# Patient Record
Sex: FEMALE | Race: WHITE | NOT HISPANIC OR LATINO | URBAN - METROPOLITAN AREA
[De-identification: names, ages, dates, MRNs, and addresses within clinical notes are randomized per-mention and may not be internally consistent; named-entity substitution may affect disease eponyms.]

---

## 2017-08-31 ENCOUNTER — EMERGENCY (EMERGENCY)
Facility: HOSPITAL | Age: 52
LOS: 1 days | End: 2017-08-31
Attending: EMERGENCY MEDICINE | Admitting: EMERGENCY MEDICINE
Payer: COMMERCIAL

## 2017-08-31 VITALS
HEART RATE: 55 BPM | SYSTOLIC BLOOD PRESSURE: 117 MMHG | DIASTOLIC BLOOD PRESSURE: 67 MMHG | RESPIRATION RATE: 20 BRPM | OXYGEN SATURATION: 100 % | TEMPERATURE: 99 F

## 2017-08-31 VITALS
SYSTOLIC BLOOD PRESSURE: 124 MMHG | DIASTOLIC BLOOD PRESSURE: 70 MMHG | HEART RATE: 60 BPM | OXYGEN SATURATION: 100 % | RESPIRATION RATE: 16 BRPM

## 2017-08-31 LAB
ALBUMIN SERPL ELPH-MCNC: 3.9 G/DL — SIGNIFICANT CHANGE UP (ref 3.3–5)
ALP SERPL-CCNC: 71 U/L — SIGNIFICANT CHANGE UP (ref 40–120)
ALT FLD-CCNC: 40 U/L RC — SIGNIFICANT CHANGE UP (ref 10–45)
ANION GAP SERPL CALC-SCNC: 12 MMOL/L — SIGNIFICANT CHANGE UP (ref 5–17)
APPEARANCE UR: CLEAR — SIGNIFICANT CHANGE UP
AST SERPL-CCNC: 26 U/L — SIGNIFICANT CHANGE UP (ref 10–40)
BASOPHILS # BLD AUTO: 0.1 K/UL — SIGNIFICANT CHANGE UP (ref 0–0.2)
BASOPHILS NFR BLD AUTO: 0.7 % — SIGNIFICANT CHANGE UP (ref 0–2)
BILIRUB SERPL-MCNC: 0.4 MG/DL — SIGNIFICANT CHANGE UP (ref 0.2–1.2)
BILIRUB UR-MCNC: NEGATIVE — SIGNIFICANT CHANGE UP
BUN SERPL-MCNC: 13 MG/DL — SIGNIFICANT CHANGE UP (ref 7–23)
CALCIUM SERPL-MCNC: 9.2 MG/DL — SIGNIFICANT CHANGE UP (ref 8.4–10.5)
CHLORIDE SERPL-SCNC: 102 MMOL/L — SIGNIFICANT CHANGE UP (ref 96–108)
CO2 SERPL-SCNC: 25 MMOL/L — SIGNIFICANT CHANGE UP (ref 22–31)
COLOR SPEC: SIGNIFICANT CHANGE UP
CREAT SERPL-MCNC: 0.84 MG/DL — SIGNIFICANT CHANGE UP (ref 0.5–1.3)
DIFF PNL FLD: NEGATIVE — SIGNIFICANT CHANGE UP
EOSINOPHIL # BLD AUTO: 0.1 K/UL — SIGNIFICANT CHANGE UP (ref 0–0.5)
EOSINOPHIL NFR BLD AUTO: 0.8 % — SIGNIFICANT CHANGE UP (ref 0–6)
GLUCOSE SERPL-MCNC: 100 MG/DL — HIGH (ref 70–99)
GLUCOSE UR QL: NEGATIVE — SIGNIFICANT CHANGE UP
HCT VFR BLD CALC: 42.3 % — SIGNIFICANT CHANGE UP (ref 34.5–45)
HGB BLD-MCNC: 13.1 G/DL — SIGNIFICANT CHANGE UP (ref 11.5–15.5)
KETONES UR-MCNC: NEGATIVE — SIGNIFICANT CHANGE UP
LEUKOCYTE ESTERASE UR-ACNC: NEGATIVE — SIGNIFICANT CHANGE UP
LIDOCAIN IGE QN: 36 U/L — SIGNIFICANT CHANGE UP (ref 7–60)
LYMPHOCYTES # BLD AUTO: 1.3 K/UL — SIGNIFICANT CHANGE UP (ref 1–3.3)
LYMPHOCYTES # BLD AUTO: 16.9 % — SIGNIFICANT CHANGE UP (ref 13–44)
MCHC RBC-ENTMCNC: 28.8 PG — SIGNIFICANT CHANGE UP (ref 27–34)
MCHC RBC-ENTMCNC: 30.9 GM/DL — LOW (ref 32–36)
MCV RBC AUTO: 93.2 FL — SIGNIFICANT CHANGE UP (ref 80–100)
MONOCYTES # BLD AUTO: 0.6 K/UL — SIGNIFICANT CHANGE UP (ref 0–0.9)
MONOCYTES NFR BLD AUTO: 7.6 % — SIGNIFICANT CHANGE UP (ref 2–14)
NEUTROPHILS # BLD AUTO: 5.6 K/UL — SIGNIFICANT CHANGE UP (ref 1.8–7.4)
NEUTROPHILS NFR BLD AUTO: 74 % — SIGNIFICANT CHANGE UP (ref 43–77)
NITRITE UR-MCNC: NEGATIVE — SIGNIFICANT CHANGE UP
PH UR: 7 — SIGNIFICANT CHANGE UP (ref 5–8)
PLATELET # BLD AUTO: 296 K/UL — SIGNIFICANT CHANGE UP (ref 150–400)
POTASSIUM SERPL-MCNC: 4.1 MMOL/L — SIGNIFICANT CHANGE UP (ref 3.5–5.3)
POTASSIUM SERPL-SCNC: 4.1 MMOL/L — SIGNIFICANT CHANGE UP (ref 3.5–5.3)
PROT SERPL-MCNC: 8 G/DL — SIGNIFICANT CHANGE UP (ref 6–8.3)
PROT UR-MCNC: NEGATIVE — SIGNIFICANT CHANGE UP
RBC # BLD: 4.54 M/UL — SIGNIFICANT CHANGE UP (ref 3.8–5.2)
RBC # FLD: 12.3 % — SIGNIFICANT CHANGE UP (ref 10.3–14.5)
RBC CASTS # UR COMP ASSIST: SIGNIFICANT CHANGE UP /HPF (ref 0–2)
SODIUM SERPL-SCNC: 139 MMOL/L — SIGNIFICANT CHANGE UP (ref 135–145)
SP GR SPEC: 1.01 — LOW (ref 1.01–1.02)
UROBILINOGEN FLD QL: NEGATIVE — SIGNIFICANT CHANGE UP
WBC # BLD: 7.6 K/UL — SIGNIFICANT CHANGE UP (ref 3.8–10.5)
WBC # FLD AUTO: 7.6 K/UL — SIGNIFICANT CHANGE UP (ref 3.8–10.5)

## 2017-08-31 PROCEDURE — 85027 COMPLETE CBC AUTOMATED: CPT

## 2017-08-31 PROCEDURE — 80053 COMPREHEN METABOLIC PANEL: CPT

## 2017-08-31 PROCEDURE — 74176 CT ABD & PELVIS W/O CONTRAST: CPT | Mod: 26

## 2017-08-31 PROCEDURE — 81001 URINALYSIS AUTO W/SCOPE: CPT

## 2017-08-31 PROCEDURE — 96374 THER/PROPH/DIAG INJ IV PUSH: CPT

## 2017-08-31 PROCEDURE — 99285 EMERGENCY DEPT VISIT HI MDM: CPT

## 2017-08-31 PROCEDURE — 99284 EMERGENCY DEPT VISIT MOD MDM: CPT | Mod: 25

## 2017-08-31 PROCEDURE — 87086 URINE CULTURE/COLONY COUNT: CPT

## 2017-08-31 PROCEDURE — 74176 CT ABD & PELVIS W/O CONTRAST: CPT

## 2017-08-31 PROCEDURE — 83690 ASSAY OF LIPASE: CPT

## 2017-08-31 RX ORDER — KETOROLAC TROMETHAMINE 30 MG/ML
15 SYRINGE (ML) INJECTION ONCE
Qty: 0 | Refills: 0 | Status: DISCONTINUED | OUTPATIENT
Start: 2017-08-31 | End: 2017-08-31

## 2017-08-31 RX ORDER — CEFUROXIME AXETIL 250 MG
1 TABLET ORAL
Qty: 20 | Refills: 0
Start: 2017-08-31 | End: 2017-09-10

## 2017-08-31 RX ORDER — SODIUM CHLORIDE 9 MG/ML
1000 INJECTION INTRAMUSCULAR; INTRAVENOUS; SUBCUTANEOUS ONCE
Qty: 0 | Refills: 0 | Status: COMPLETED | OUTPATIENT
Start: 2017-08-31 | End: 2017-08-31

## 2017-08-31 RX ADMIN — Medication 15 MILLIGRAM(S): at 11:22

## 2017-08-31 RX ADMIN — Medication 15 MILLIGRAM(S): at 13:38

## 2017-08-31 RX ADMIN — SODIUM CHLORIDE 1000 MILLILITER(S): 9 INJECTION INTRAMUSCULAR; INTRAVENOUS; SUBCUTANEOUS at 11:22

## 2017-08-31 NOTE — ED PROVIDER NOTE - MEDICAL DECISION MAKING DETAILS
Flank pain most consistent with nephrolithiasis  1) LABS/UA/IVF if vomiting  2) CT abd/pelvis - [place] 3) pain control and anti-emetics as needed 4) reassess 5) urologic consultation if necessary

## 2017-08-31 NOTE — ED PROVIDER NOTE - ATTENDING CONTRIBUTION TO CARE
Pt with L flank/back pain radiating across the abdomen.  +urinary frequency.  No midline spine td, +td paraspinal L.

## 2017-08-31 NOTE — ED PROVIDER NOTE - NS ED ROS FT
No fever/chills, no change in vision, no throat pain, no chest pain, +abdominal pain, no nausea/vomiting,  no dysuria, no joint pain, no rashes, no focal numbness or weakness, no known mental health issues

## 2017-08-31 NOTE — ED ADULT NURSE NOTE - OBJECTIVE STATEMENT
52 y.o female c c/o L flank pain. Pt denies dysuria/ hematuria. Pt admits to urinary frequency. Hx of renal calculi. Afebrile. No chills. Denies CP/SOB. Abd soft. No lower extremity edema. Skin intact. 52 y.o female c c/o L flank pain. Pt denies dysuria/ hematuria. Pt admits to urinary frequency. Hx of renal calculi. Afebrile. No chills. Denies CP/SOB. Abd soft L flank pain radiates into Abd. No lower extremity edema. Skin intact. Denies NVD. Denies vaginal discharge. Family at bedside,

## 2017-08-31 NOTE — ED PROVIDER NOTE - OBJECTIVE STATEMENT
53 yo F ?hx of renal stone on u/s pw left flank pain and urinary freq since sunday. Pain not releived by oxycodone this am so pt presented to ed. Pain is persitant radiates to abdomen. Denies nausea, vomiting, diarrhea, constipation, dysuria, hematuria, vaginal discharge, fever, chills, 53 yo F ?hx of renal stone on u/s pw left flank pain and urinary freq since sunday. Pain not releived by oxycodone this am so pt presented to ed. Pain is persistent radiates to abdomen. Denies nausea, vomiting, diarrhea, constipation, dysuria, hematuria, vaginal discharge, fever, chills,

## 2017-08-31 NOTE — ED PROVIDER NOTE - PLAN OF CARE
Follow up with your PMD within 48-72 hours.  Take Ceftin (Cefuroxime) 1 tab by mouth twice a day for 7 days.  Increase fluids. Motrin 600mg every 8 hours with food for pain. Worsening pain, new fever, chills, nausea, vomiting return to ER Follow up with your PMD within 48-72 hrs. Show copies of your reports given to you. Take all of your medications as previously prescribed. Take Ceftin (Cefuroxime) 1 tab by mouth twice a day for 7 days.  Increase fluids. Motrin 600mg every 8 hours with food for pain. Worsening pain, new fever, chills, nausea, vomiting return to ER

## 2017-09-01 LAB
CULTURE RESULTS: NO GROWTH — SIGNIFICANT CHANGE UP
SPECIMEN SOURCE: SIGNIFICANT CHANGE UP

## 2017-12-14 ENCOUNTER — RESULT REVIEW (OUTPATIENT)
Age: 52
End: 2017-12-14

## 2019-02-18 NOTE — ED PROVIDER NOTE - NS ED ATTENDING STATEMENT MOD
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 I have personally seen and examined this patient.  I have fully participated in the care of this patient. I have reviewed all pertinent clinical information, including history, physical exam, plan and the Resident’s note and agree except as noted.

## 2019-06-07 ENCOUNTER — OTHER (OUTPATIENT)
Age: 54
End: 2019-06-07

## 2019-06-13 ENCOUNTER — APPOINTMENT (OUTPATIENT)
Dept: INTERNAL MEDICINE | Facility: CLINIC | Age: 54
End: 2019-06-13
Payer: COMMERCIAL

## 2019-06-13 VITALS
BODY MASS INDEX: 36.99 KG/M2 | WEIGHT: 201 LBS | TEMPERATURE: 98.4 F | OXYGEN SATURATION: 96 % | HEART RATE: 70 BPM | SYSTOLIC BLOOD PRESSURE: 125 MMHG | DIASTOLIC BLOOD PRESSURE: 77 MMHG | HEIGHT: 62 IN | RESPIRATION RATE: 14 BRPM

## 2019-06-13 DIAGNOSIS — Z78.9 OTHER SPECIFIED HEALTH STATUS: ICD-10-CM

## 2019-06-13 PROCEDURE — 36415 COLL VENOUS BLD VENIPUNCTURE: CPT

## 2019-06-13 PROCEDURE — 99203 OFFICE O/P NEW LOW 30 MIN: CPT | Mod: 25

## 2019-06-14 LAB
APPEARANCE: CLEAR
BACTERIA: NEGATIVE
BASOPHILS # BLD AUTO: 0.04 K/UL
BASOPHILS NFR BLD AUTO: 0.5 %
BILIRUBIN URINE: NEGATIVE
BLOOD URINE: NEGATIVE
COLOR: YELLOW
EOSINOPHIL # BLD AUTO: 0.2 K/UL
EOSINOPHIL NFR BLD AUTO: 2.3 %
GLUCOSE QUALITATIVE U: NEGATIVE
HCT VFR BLD CALC: 44.2 %
HGB BLD-MCNC: 13.5 G/DL
HYALINE CASTS: 2 /LPF
IMM GRANULOCYTES NFR BLD AUTO: 0.3 %
KETONES URINE: NEGATIVE
LEUKOCYTE ESTERASE URINE: NEGATIVE
LYMPHOCYTES # BLD AUTO: 2.28 K/UL
LYMPHOCYTES NFR BLD AUTO: 26 %
MAN DIFF?: NORMAL
MCHC RBC-ENTMCNC: 29.3 PG
MCHC RBC-ENTMCNC: 30.5 GM/DL
MCV RBC AUTO: 96.1 FL
MICROSCOPIC-UA: NORMAL
MONOCYTES # BLD AUTO: 0.71 K/UL
MONOCYTES NFR BLD AUTO: 8.1 %
NEUTROPHILS # BLD AUTO: 5.51 K/UL
NEUTROPHILS NFR BLD AUTO: 62.8 %
NITRITE URINE: NEGATIVE
PH URINE: 6.5
PLATELET # BLD AUTO: 325 K/UL
PROTEIN URINE: NEGATIVE
RBC # BLD: 4.6 M/UL
RBC # FLD: 14.1 %
RED BLOOD CELLS URINE: 2 /HPF
SPECIFIC GRAVITY URINE: 1.03
SQUAMOUS EPITHELIAL CELLS: 1 /HPF
UROBILINOGEN URINE: NORMAL
WBC # FLD AUTO: 8.77 K/UL
WHITE BLOOD CELLS URINE: 2 /HPF

## 2019-06-15 PROBLEM — Z78.9 NON-SMOKER: Status: ACTIVE | Noted: 2019-06-15

## 2019-06-15 NOTE — PHYSICAL EXAM
[Well Developed] : well developed [No Acute Distress] : no acute distress [Well Nourished] : well nourished [Well-Appearing] : well-appearing [Normal Sclera/Conjunctiva] : normal sclera/conjunctiva [PERRL] : pupils equal round and reactive to light [Normal Oropharynx] : the oropharynx was normal [Normal Outer Ear/Nose] : the outer ears and nose were normal in appearance [EOMI] : extraocular movements intact [Supple] : supple [No JVD] : no jugular venous distention [Thyroid Normal, No Nodules] : the thyroid was normal and there were no nodules present [No Lymphadenopathy] : no lymphadenopathy [Clear to Auscultation] : lungs were clear to auscultation bilaterally [No Accessory Muscle Use] : no accessory muscle use [No Respiratory Distress] : no respiratory distress  [Normal Rate] : normal rate  [Regular Rhythm] : with a regular rhythm [Normal S1, S2] : normal S1 and S2 [No Murmur] : no murmur heard [No Abdominal Bruit] : a ~M bruit was not heard ~T in the abdomen [No Carotid Bruits] : no carotid bruits [No Varicosities] : no varicosities [Pedal Pulses Present] : the pedal pulses are present [No Edema] : there was no peripheral edema [No Extremity Clubbing/Cyanosis] : no extremity clubbing/cyanosis [No Palpable Aorta] : no palpable aorta [Normal Appearance] : normal in appearance [No Nipple Discharge] : no nipple discharge [No Axillary Lymphadenopathy] : no axillary lymphadenopathy [Non Tender] : non-tender [No Masses] : no abdominal mass palpated [Non-distended] : non-distended [Soft] : abdomen soft [No HSM] : no HSM [Normal Bowel Sounds] : normal bowel sounds [Normal Posterior Cervical Nodes] : no posterior cervical lymphadenopathy [Normal Anterior Cervical Nodes] : no anterior cervical lymphadenopathy [No Spinal Tenderness] : no spinal tenderness [No CVA Tenderness] : no CVA  tenderness [Grossly Normal Strength/Tone] : grossly normal strength/tone [No Joint Swelling] : no joint swelling [No Rash] : no rash [No Focal Deficits] : no focal deficits [Normal Gait] : normal gait [Coordination Grossly Intact] : coordination grossly intact [Deep Tendon Reflexes (DTR)] : deep tendon reflexes were 2+ and symmetric [Normal Insight/Judgement] : insight and judgment were intact [Normal Affect] : the affect was normal [de-identified] : n [FreeTextEntry1] : deferred

## 2019-06-15 NOTE — HEALTH RISK ASSESSMENT
[Very Good] : ~his/her~ current health as very good [No falls in past year] : Patient reported no falls in the past year [0] : 2) Feeling down, depressed, or hopeless: Not at all (0) [] : No

## 2019-06-15 NOTE — HISTORY OF PRESENT ILLNESS
[FreeTextEntry1] : pt here for evaluation and blood tests\par pain right foot, seen by podiatrist and given Meloxicam [de-identified] : 54 yo wf with hx of hypothyroidism, came to the office for blood tests and adjustment of her medications

## 2019-06-17 ENCOUNTER — RX RENEWAL (OUTPATIENT)
Age: 54
End: 2019-06-17

## 2019-06-17 LAB
ALBUMIN SERPL ELPH-MCNC: 4.4 G/DL
ALP BLD-CCNC: 78 U/L
ALT SERPL-CCNC: 43 U/L
ANION GAP SERPL CALC-SCNC: 13 MMOL/L
AST SERPL-CCNC: 39 U/L
BILIRUB SERPL-MCNC: 0.2 MG/DL
BUN SERPL-MCNC: 19 MG/DL
CALCIUM SERPL-MCNC: 9.3 MG/DL
CHLORIDE SERPL-SCNC: 102 MMOL/L
CHOLEST SERPL-MCNC: 217 MG/DL
CHOLEST/HDLC SERPL: 4.1 RATIO
CO2 SERPL-SCNC: 24 MMOL/L
CREAT SERPL-MCNC: 0.82 MG/DL
GLUCOSE SERPL-MCNC: 66 MG/DL
HDLC SERPL-MCNC: 53 MG/DL
LDLC SERPL CALC-MCNC: 148 MG/DL
POTASSIUM SERPL-SCNC: 4.8 MMOL/L
PROT SERPL-MCNC: 7.7 G/DL
SODIUM SERPL-SCNC: 139 MMOL/L
T3 SERPL-MCNC: 87 NG/DL
T4 FREE SERPL-MCNC: 1.3 NG/DL
T4 SERPL-MCNC: 7.7 UG/DL
TRIGL SERPL-MCNC: 82 MG/DL
TSH SERPL-ACNC: 4.39 UIU/ML

## 2019-06-19 ENCOUNTER — OTHER (OUTPATIENT)
Age: 54
End: 2019-06-19

## 2019-10-04 ENCOUNTER — RESULT REVIEW (OUTPATIENT)
Age: 54
End: 2019-10-04

## 2020-01-03 ENCOUNTER — APPOINTMENT (OUTPATIENT)
Dept: INTERNAL MEDICINE | Facility: CLINIC | Age: 55
End: 2020-01-03
Payer: COMMERCIAL

## 2020-01-03 ENCOUNTER — NON-APPOINTMENT (OUTPATIENT)
Age: 55
End: 2020-01-03

## 2020-01-03 VITALS
SYSTOLIC BLOOD PRESSURE: 128 MMHG | WEIGHT: 197 LBS | BODY MASS INDEX: 36.25 KG/M2 | TEMPERATURE: 99.1 F | HEART RATE: 74 BPM | HEIGHT: 62 IN | RESPIRATION RATE: 15 BRPM | OXYGEN SATURATION: 98 % | DIASTOLIC BLOOD PRESSURE: 80 MMHG

## 2020-01-03 PROCEDURE — 93000 ELECTROCARDIOGRAM COMPLETE: CPT

## 2020-01-03 PROCEDURE — 99396 PREV VISIT EST AGE 40-64: CPT | Mod: 25

## 2020-01-03 PROCEDURE — 36415 COLL VENOUS BLD VENIPUNCTURE: CPT

## 2020-01-03 NOTE — PHYSICAL EXAM
[No Acute Distress] : no acute distress [Well Nourished] : well nourished [Well Developed] : well developed [Well-Appearing] : well-appearing [Normal Sclera/Conjunctiva] : normal sclera/conjunctiva [PERRL] : pupils equal round and reactive to light [EOMI] : extraocular movements intact [Normal Outer Ear/Nose] : the outer ears and nose were normal in appearance [No JVD] : no jugular venous distention [No Lymphadenopathy] : no lymphadenopathy [Supple] : supple [Thyroid Normal, No Nodules] : the thyroid was normal and there were no nodules present [No Respiratory Distress] : no respiratory distress  [No Accessory Muscle Use] : no accessory muscle use [Clear to Auscultation] : lungs were clear to auscultation bilaterally [Normal Rate] : normal rate  [Regular Rhythm] : with a regular rhythm [Normal S1, S2] : normal S1 and S2 [No Murmur] : no murmur heard [No Carotid Bruits] : no carotid bruits [No Abdominal Bruit] : a ~M bruit was not heard ~T in the abdomen [No Varicosities] : no varicosities [Pedal Pulses Present] : the pedal pulses are present [No Edema] : there was no peripheral edema [No Palpable Aorta] : no palpable aorta [No Extremity Clubbing/Cyanosis] : no extremity clubbing/cyanosis [Normal Appearance] : normal in appearance [No Nipple Discharge] : no nipple discharge [No Axillary Lymphadenopathy] : no axillary lymphadenopathy [Soft] : abdomen soft [Non Tender] : non-tender [Non-distended] : non-distended [No Masses] : no abdominal mass palpated [No HSM] : no HSM [Normal Bowel Sounds] : normal bowel sounds [Normal Posterior Cervical Nodes] : no posterior cervical lymphadenopathy [Normal Anterior Cervical Nodes] : no anterior cervical lymphadenopathy [No CVA Tenderness] : no CVA  tenderness [No Spinal Tenderness] : no spinal tenderness [No Joint Swelling] : no joint swelling [Grossly Normal Strength/Tone] : grossly normal strength/tone [No Rash] : no rash [Coordination Grossly Intact] : coordination grossly intact [No Focal Deficits] : no focal deficits [Normal Gait] : normal gait [Deep Tendon Reflexes (DTR)] : deep tendon reflexes were 2+ and symmetric [Normal Affect] : the affect was normal [Normal Insight/Judgement] : insight and judgment were intact [de-identified] : three small erythematous areas on the palate  [FreeTextEntry1] : deferred

## 2020-01-03 NOTE — HEALTH RISK ASSESSMENT
[Good] : ~his/her~ current health as good [No] : No [No falls in past year] : Patient reported no falls in the past year [0] : 2) Feeling down, depressed, or hopeless: Not at all (0) [] : No [MammogramDate] : 10/19 [MammogramComments] : [employer]

## 2020-01-03 NOTE — HISTORY OF PRESENT ILLNESS
[FreeTextEntry1] : Physical examination  [de-identified] : CRYSTAL LI is a 54 year old female patient who presents today for a physical examination. Pt relates that she was seen by gastroenterologist two months ago, where an EGD and colonoscopy and were done. Benign polyps were found, as well as gastritis, ulcers, and H. pylori. Antibiotics were administered and she completed the treatment December 26, however, she is still taking Omeprazole. \par \par

## 2020-01-03 NOTE — END OF VISIT
[FreeTextEntry3] : I, Poly Mcdowell, personally transcribed these services in the presence of Dr. Emma Winkler MD. I, Dr. Emma Winkler MD, personally performed the services described in this documentation as scribed by Poly Mcdowell in my presence and it is both accurate and complete.

## 2020-01-04 LAB
25(OH)D3 SERPL-MCNC: 29.1 NG/ML
ALBUMIN SERPL ELPH-MCNC: 4.2 G/DL
ALP BLD-CCNC: 72 U/L
ALT SERPL-CCNC: 32 U/L
ANION GAP SERPL CALC-SCNC: 16 MMOL/L
APPEARANCE: CLEAR
AST SERPL-CCNC: 31 U/L
BACTERIA: NEGATIVE
BASOPHILS # BLD AUTO: 0.05 K/UL
BASOPHILS NFR BLD AUTO: 0.9 %
BILIRUB SERPL-MCNC: 0.3 MG/DL
BILIRUBIN URINE: NEGATIVE
BLOOD URINE: NEGATIVE
BUN SERPL-MCNC: 17 MG/DL
CALCIUM SERPL-MCNC: 9.3 MG/DL
CHLORIDE SERPL-SCNC: 104 MMOL/L
CHOLEST SERPL-MCNC: 235 MG/DL
CHOLEST/HDLC SERPL: 4.1 RATIO
CO2 SERPL-SCNC: 21 MMOL/L
COLOR: YELLOW
CREAT SERPL-MCNC: 0.82 MG/DL
EOSINOPHIL # BLD AUTO: 0.22 K/UL
EOSINOPHIL NFR BLD AUTO: 4 %
GLUCOSE QUALITATIVE U: NEGATIVE
GLUCOSE SERPL-MCNC: 74 MG/DL
HCT VFR BLD CALC: 44.7 %
HDLC SERPL-MCNC: 57 MG/DL
HGB BLD-MCNC: 13.8 G/DL
HYALINE CASTS: 1 /LPF
IMM GRANULOCYTES NFR BLD AUTO: 0.2 %
KETONES URINE: NEGATIVE
LDLC SERPL CALC-MCNC: 163 MG/DL
LEUKOCYTE ESTERASE URINE: NEGATIVE
LYMPHOCYTES # BLD AUTO: 1.39 K/UL
LYMPHOCYTES NFR BLD AUTO: 25.3 %
MAN DIFF?: NORMAL
MCHC RBC-ENTMCNC: 29.7 PG
MCHC RBC-ENTMCNC: 30.9 GM/DL
MCV RBC AUTO: 96.1 FL
MICROSCOPIC-UA: NORMAL
MONOCYTES # BLD AUTO: 0.55 K/UL
MONOCYTES NFR BLD AUTO: 10 %
NEUTROPHILS # BLD AUTO: 3.28 K/UL
NEUTROPHILS NFR BLD AUTO: 59.6 %
NITRITE URINE: NEGATIVE
PH URINE: 6
PLATELET # BLD AUTO: 305 K/UL
POTASSIUM SERPL-SCNC: 4.5 MMOL/L
PROT SERPL-MCNC: 7.4 G/DL
PROTEIN URINE: NEGATIVE
RBC # BLD: 4.65 M/UL
RBC # FLD: 14.4 %
RED BLOOD CELLS URINE: 1 /HPF
SODIUM SERPL-SCNC: 141 MMOL/L
SPECIFIC GRAVITY URINE: 1.02
SQUAMOUS EPITHELIAL CELLS: 2 /HPF
T3 SERPL-MCNC: 88 NG/DL
T4 FREE SERPL-MCNC: 1.3 NG/DL
T4 SERPL-MCNC: 8.5 UG/DL
TRIGL SERPL-MCNC: 74 MG/DL
TSH SERPL-ACNC: 3.05 UIU/ML
UROBILINOGEN URINE: NORMAL
WBC # FLD AUTO: 5.5 K/UL
WHITE BLOOD CELLS URINE: 1 /HPF

## 2020-01-08 ENCOUNTER — MOBILE ON CALL (OUTPATIENT)
Age: 55
End: 2020-01-08

## 2020-01-17 ENCOUNTER — APPOINTMENT (OUTPATIENT)
Dept: INTERNAL MEDICINE | Facility: CLINIC | Age: 55
End: 2020-01-17

## 2020-05-04 ENCOUNTER — APPOINTMENT (OUTPATIENT)
Dept: INTERNAL MEDICINE | Facility: CLINIC | Age: 55
End: 2020-05-04
Payer: MEDICAID

## 2020-05-04 VITALS
SYSTOLIC BLOOD PRESSURE: 122 MMHG | BODY MASS INDEX: 34.04 KG/M2 | HEART RATE: 74 BPM | WEIGHT: 185 LBS | DIASTOLIC BLOOD PRESSURE: 80 MMHG | RESPIRATION RATE: 15 BRPM | HEIGHT: 62 IN | TEMPERATURE: 98.3 F | OXYGEN SATURATION: 97 %

## 2020-05-04 PROCEDURE — 99213 OFFICE O/P EST LOW 20 MIN: CPT | Mod: 25

## 2020-05-04 PROCEDURE — 36415 COLL VENOUS BLD VENIPUNCTURE: CPT

## 2020-05-04 NOTE — HISTORY OF PRESENT ILLNESS
[FreeTextEntry1] : F/U BLOOD TESTS\par ANOSMIA SINCE MARCH 19/2020 [de-identified] : 55 YO WF WITH HX OF HYPOTHYROIDISM, CAME TO THE OFFICE FOR BLOOD TESTS. PT RELATES ANOSMIA SINCE  MARCH 19 /2020\par  NO TEST FOR COVID 19 WAS DONE

## 2020-05-05 LAB
ALBUMIN SERPL ELPH-MCNC: 4.1 G/DL
ALP BLD-CCNC: 79 U/L
ALT SERPL-CCNC: 30 U/L
ANION GAP SERPL CALC-SCNC: 11 MMOL/L
AST SERPL-CCNC: 26 U/L
BASOPHILS # BLD AUTO: 0.03 K/UL
BASOPHILS NFR BLD AUTO: 0.5 %
BILIRUB SERPL-MCNC: 0.4 MG/DL
BUN SERPL-MCNC: 13 MG/DL
CALCIUM SERPL-MCNC: 9.2 MG/DL
CHLORIDE SERPL-SCNC: 106 MMOL/L
CHOLEST SERPL-MCNC: 221 MG/DL
CHOLEST/HDLC SERPL: 3.9 RATIO
CO2 SERPL-SCNC: 23 MMOL/L
CREAT SERPL-MCNC: 0.72 MG/DL
EOSINOPHIL # BLD AUTO: 0.09 K/UL
EOSINOPHIL NFR BLD AUTO: 1.4 %
GLUCOSE SERPL-MCNC: 81 MG/DL
HCT VFR BLD CALC: 40.9 %
HDLC SERPL-MCNC: 56 MG/DL
HGB BLD-MCNC: 12.8 G/DL
IMM GRANULOCYTES NFR BLD AUTO: 0.2 %
LDLC SERPL CALC-MCNC: 153 MG/DL
LYMPHOCYTES # BLD AUTO: 1.81 K/UL
LYMPHOCYTES NFR BLD AUTO: 27.4 %
MAN DIFF?: NORMAL
MCHC RBC-ENTMCNC: 29.8 PG
MCHC RBC-ENTMCNC: 31.3 GM/DL
MCV RBC AUTO: 95.3 FL
MONOCYTES # BLD AUTO: 0.62 K/UL
MONOCYTES NFR BLD AUTO: 9.4 %
NEUTROPHILS # BLD AUTO: 4.04 K/UL
NEUTROPHILS NFR BLD AUTO: 61.1 %
PLATELET # BLD AUTO: 281 K/UL
POTASSIUM SERPL-SCNC: 4.7 MMOL/L
PROT SERPL-MCNC: 7.2 G/DL
RBC # BLD: 4.29 M/UL
RBC # FLD: 14.6 %
SODIUM SERPL-SCNC: 140 MMOL/L
T3 SERPL-MCNC: 101 NG/DL
T4 FREE SERPL-MCNC: 1.4 NG/DL
T4 SERPL-MCNC: 9 UG/DL
TRIGL SERPL-MCNC: 60 MG/DL
TSH SERPL-ACNC: 2.44 UIU/ML
WBC # FLD AUTO: 6.6 K/UL

## 2020-08-19 ENCOUNTER — RX RENEWAL (OUTPATIENT)
Age: 55
End: 2020-08-19

## 2020-10-20 ENCOUNTER — APPOINTMENT (OUTPATIENT)
Dept: INTERNAL MEDICINE | Facility: CLINIC | Age: 55
End: 2020-10-20
Payer: MEDICAID

## 2020-10-20 VITALS
SYSTOLIC BLOOD PRESSURE: 125 MMHG | HEART RATE: 67 BPM | RESPIRATION RATE: 14 BRPM | DIASTOLIC BLOOD PRESSURE: 80 MMHG | TEMPERATURE: 98.7 F | WEIGHT: 187 LBS | OXYGEN SATURATION: 97 % | BODY MASS INDEX: 34.41 KG/M2 | HEIGHT: 62 IN

## 2020-10-20 PROCEDURE — 36415 COLL VENOUS BLD VENIPUNCTURE: CPT

## 2020-10-20 PROCEDURE — 90686 IIV4 VACC NO PRSV 0.5 ML IM: CPT

## 2020-10-20 PROCEDURE — G0008: CPT

## 2020-10-20 PROCEDURE — 99214 OFFICE O/P EST MOD 30 MIN: CPT | Mod: 25

## 2020-10-20 PROCEDURE — 99072 ADDL SUPL MATRL&STAF TM PHE: CPT

## 2020-10-21 NOTE — HISTORY OF PRESENT ILLNESS
DISPLAY PLAN FREE TEXT
[FreeTextEntry8] : CRYSTAL LI is a 55 year old female patient with a PMHx of HLD and hypothyroidism who presents today complaining of pain of the right foot. Pain has occurred on and off for two years, however, last week, pain became worse. She requests H. Influenza vaccination and screening for COVID-19 antibodies.

## 2020-10-21 NOTE — HEALTH RISK ASSESSMENT
[Never (0 pts)] : Never (0 points) [No] : In the past 12 months have you used drugs other than those required for medical reasons? No [No falls in past year] : Patient reported no falls in the past year [0] : 2) Feeling down, depressed, or hopeless: Not at all (0) [] : No [Audit-CScore] : 0 [de-identified] : sedentary [de-identified] : regular [[id number]] : 0

## 2020-10-21 NOTE — PLAN
[FreeTextEntry1] : H. Influenza vaccination \par \par Screening for COVID-19 antibodies \par \par Blood tests sent to the lab \par \par X-ray right foot\par \par Meloxicam 15mg PO QD

## 2020-10-21 NOTE — PHYSICAL EXAM
[Well Nourished] : well nourished [No Acute Distress] : no acute distress [Normal Sclera/Conjunctiva] : normal sclera/conjunctiva [Well-Appearing] : well-appearing [Well Developed] : well developed [PERRL] : pupils equal round and reactive to light [EOMI] : extraocular movements intact [Normal Outer Ear/Nose] : the outer ears and nose were normal in appearance [Normal Oropharynx] : the oropharynx was normal [No Lymphadenopathy] : no lymphadenopathy [No JVD] : no jugular venous distention [Supple] : supple [No Respiratory Distress] : no respiratory distress  [Thyroid Normal, No Nodules] : the thyroid was normal and there were no nodules present [No Accessory Muscle Use] : no accessory muscle use [Normal Rate] : normal rate  [Clear to Auscultation] : lungs were clear to auscultation bilaterally [No Murmur] : no murmur heard [Normal S1, S2] : normal S1 and S2 [Regular Rhythm] : with a regular rhythm [No Varicosities] : no varicosities [No Carotid Bruits] : no carotid bruits [No Abdominal Bruit] : a ~M bruit was not heard ~T in the abdomen [No Edema] : there was no peripheral edema [No Palpable Aorta] : no palpable aorta [Pedal Pulses Present] : the pedal pulses are present [No Extremity Clubbing/Cyanosis] : no extremity clubbing/cyanosis [No Axillary Lymphadenopathy] : no axillary lymphadenopathy [Normal Appearance] : normal in appearance [No Nipple Discharge] : no nipple discharge [Soft] : abdomen soft [Non Tender] : non-tender [Non-distended] : non-distended [No Masses] : no abdominal mass palpated [Normal Bowel Sounds] : normal bowel sounds [No HSM] : no HSM [Normal Anterior Cervical Nodes] : no anterior cervical lymphadenopathy [No CVA Tenderness] : no CVA  tenderness [Normal Posterior Cervical Nodes] : no posterior cervical lymphadenopathy [No Spinal Tenderness] : no spinal tenderness [No Joint Swelling] : no joint swelling [Grossly Normal Strength/Tone] : grossly normal strength/tone [No Rash] : no rash [Coordination Grossly Intact] : coordination grossly intact [Normal Gait] : normal gait [No Focal Deficits] : no focal deficits [Normal Affect] : the affect was normal [Normal Insight/Judgement] : insight and judgment were intact [FreeTextEntry1] : deferred [de-identified] : tenderness to palpation right foot

## 2020-10-28 LAB
25(OH)D3 SERPL-MCNC: 41.8 NG/ML
ALBUMIN SERPL ELPH-MCNC: 4.5 G/DL
ALP BLD-CCNC: 111 U/L
ALT SERPL-CCNC: 79 U/L
ANION GAP SERPL CALC-SCNC: 13 MMOL/L
APPEARANCE: CLEAR
AST SERPL-CCNC: 45 U/L
BACTERIA: NEGATIVE
BASOPHILS # BLD AUTO: 0.05 K/UL
BASOPHILS NFR BLD AUTO: 0.6 %
BILIRUB SERPL-MCNC: 0.4 MG/DL
BILIRUBIN URINE: NEGATIVE
BLOOD URINE: NEGATIVE
BUN SERPL-MCNC: 19 MG/DL
CALCIUM SERPL-MCNC: 9.8 MG/DL
CHLORIDE SERPL-SCNC: 102 MMOL/L
CHOLEST SERPL-MCNC: 229 MG/DL
CO2 SERPL-SCNC: 25 MMOL/L
COLOR: YELLOW
CREAT SERPL-MCNC: 0.76 MG/DL
EOSINOPHIL # BLD AUTO: 0.23 K/UL
EOSINOPHIL NFR BLD AUTO: 2.6 %
GLUCOSE QUALITATIVE U: NEGATIVE
GLUCOSE SERPL-MCNC: 88 MG/DL
HCT VFR BLD CALC: 45.7 %
HDLC SERPL-MCNC: 66 MG/DL
HGB BLD-MCNC: 14.3 G/DL
HYALINE CASTS: 4 /LPF
IMM GRANULOCYTES NFR BLD AUTO: 0.2 %
KETONES URINE: NEGATIVE
LDLC SERPL CALC-MCNC: 149 MG/DL
LEUKOCYTE ESTERASE URINE: ABNORMAL
LYMPHOCYTES # BLD AUTO: 2.44 K/UL
LYMPHOCYTES NFR BLD AUTO: 27.8 %
MAN DIFF?: NORMAL
MCHC RBC-ENTMCNC: 29.7 PG
MCHC RBC-ENTMCNC: 31.3 GM/DL
MCV RBC AUTO: 94.8 FL
MICROSCOPIC-UA: NORMAL
MONOCYTES # BLD AUTO: 0.8 K/UL
MONOCYTES NFR BLD AUTO: 9.1 %
NEUTROPHILS # BLD AUTO: 5.25 K/UL
NEUTROPHILS NFR BLD AUTO: 59.7 %
NITRITE URINE: NEGATIVE
NONHDLC SERPL-MCNC: 164 MG/DL
PH URINE: 5.5
PLATELET # BLD AUTO: 285 K/UL
POTASSIUM SERPL-SCNC: 4.5 MMOL/L
PROT SERPL-MCNC: 8.4 G/DL
PROTEIN URINE: NEGATIVE
RBC # BLD: 4.82 M/UL
RBC # FLD: 14.2 %
RED BLOOD CELLS URINE: 4 /HPF
SARS-COV-2 IGG SERPL IA-ACNC: 138 INDEX
SARS-COV-2 IGG SERPL QL IA: POSITIVE
SODIUM SERPL-SCNC: 140 MMOL/L
SPECIFIC GRAVITY URINE: 1.02
SQUAMOUS EPITHELIAL CELLS: 7 /HPF
T3 SERPL-MCNC: 109 NG/DL
T4 FREE SERPL-MCNC: 1.4 NG/DL
T4 SERPL-MCNC: 10.6 UG/DL
TRIGL SERPL-MCNC: 73 MG/DL
TSH SERPL-ACNC: 3.77 UIU/ML
UROBILINOGEN URINE: NORMAL
VIT B12 SERPL-MCNC: 876 PG/ML
WBC # FLD AUTO: 8.79 K/UL
WHITE BLOOD CELLS URINE: 15 /HPF

## 2020-11-09 ENCOUNTER — APPOINTMENT (OUTPATIENT)
Dept: INTERNAL MEDICINE | Facility: CLINIC | Age: 55
End: 2020-11-09
Payer: MEDICAID

## 2020-11-09 ENCOUNTER — APPOINTMENT (OUTPATIENT)
Dept: HEART AND VASCULAR | Facility: CLINIC | Age: 55
End: 2020-11-09

## 2020-11-09 VITALS
WEIGHT: 190 LBS | HEART RATE: 70 BPM | OXYGEN SATURATION: 98 % | TEMPERATURE: 98 F | SYSTOLIC BLOOD PRESSURE: 108 MMHG | BODY MASS INDEX: 34.96 KG/M2 | RESPIRATION RATE: 14 BRPM | DIASTOLIC BLOOD PRESSURE: 70 MMHG | HEIGHT: 62 IN

## 2020-11-09 PROCEDURE — 90715 TDAP VACCINE 7 YRS/> IM: CPT

## 2020-11-09 PROCEDURE — 99214 OFFICE O/P EST MOD 30 MIN: CPT | Mod: 25

## 2020-11-09 PROCEDURE — 99072 ADDL SUPL MATRL&STAF TM PHE: CPT

## 2020-11-09 PROCEDURE — 90471 IMMUNIZATION ADMIN: CPT

## 2020-11-09 PROCEDURE — 36415 COLL VENOUS BLD VENIPUNCTURE: CPT

## 2020-11-10 ENCOUNTER — MED ADMIN CHARGE (OUTPATIENT)
Age: 55
End: 2020-11-10

## 2020-11-10 NOTE — END OF VISIT
[FreeTextEntry3] : I, Karen Garcia, personally transcribed these services in the presence of Dr. Emma Winkler MD. I, Dr. Emma Winkler MD, personally performed the services described in this documentation as scribed by Karen Garcia in my presence and it is both accurate and complete.\par

## 2020-11-10 NOTE — HISTORY OF PRESENT ILLNESS
[FreeTextEntry1] : Follow-up\par  [de-identified] : CRYSTAL LI is a 55 year old female with a PMHx of HLD, OA, gastritis, and hypothyroidism who presents today for follow-up. She is feeling well and offers no specific complaints.\par \par Pt was found to have mildly elevated LFT's post COVID-19 infection. \par \par Pt is also here to repeat blood tests.\par \par Pt also requests TDAP vaccine.\par

## 2020-11-10 NOTE — HEALTH RISK ASSESSMENT
[No] : In the past 12 months have you used drugs other than those required for medical reasons? No [No falls in past year] : Patient reported no falls in the past year [] : No [Audit-CScore] : 0 [de-identified] : Sedentary [de-identified] : Regular

## 2020-11-10 NOTE — PHYSICAL EXAM
[No Acute Distress] : no acute distress [Well Nourished] : well nourished [Well Developed] : well developed [Well-Appearing] : well-appearing [Normal Sclera/Conjunctiva] : normal sclera/conjunctiva [PERRL] : pupils equal round and reactive to light [EOMI] : extraocular movements intact [Normal Outer Ear/Nose] : the outer ears and nose were normal in appearance [Normal Oropharynx] : the oropharynx was normal [No JVD] : no jugular venous distention [No Lymphadenopathy] : no lymphadenopathy [Supple] : supple [Thyroid Normal, No Nodules] : the thyroid was normal and there were no nodules present [No Respiratory Distress] : no respiratory distress  [No Accessory Muscle Use] : no accessory muscle use [Clear to Auscultation] : lungs were clear to auscultation bilaterally [Normal Rate] : normal rate  [Regular Rhythm] : with a regular rhythm [Normal S1, S2] : normal S1 and S2 [No Murmur] : no murmur heard [No Carotid Bruits] : no carotid bruits [No Abdominal Bruit] : a ~M bruit was not heard ~T in the abdomen [No Varicosities] : no varicosities [Pedal Pulses Present] : the pedal pulses are present [No Edema] : there was no peripheral edema [No Palpable Aorta] : no palpable aorta [No Extremity Clubbing/Cyanosis] : no extremity clubbing/cyanosis [Soft] : abdomen soft [Non Tender] : non-tender [Non-distended] : non-distended [No Masses] : no abdominal mass palpated [No HSM] : no HSM [Normal Bowel Sounds] : normal bowel sounds [Normal Posterior Cervical Nodes] : no posterior cervical lymphadenopathy [Normal Anterior Cervical Nodes] : no anterior cervical lymphadenopathy [No CVA Tenderness] : no CVA  tenderness [No Spinal Tenderness] : no spinal tenderness [No Joint Swelling] : no joint swelling [Grossly Normal Strength/Tone] : grossly normal strength/tone [No Rash] : no rash [Coordination Grossly Intact] : coordination grossly intact [No Focal Deficits] : no focal deficits [Normal Gait] : normal gait [Normal Affect] : the affect was normal [Normal Insight/Judgement] : insight and judgment were intact [de-identified] : Deferred [FreeTextEntry1] : Deferred

## 2020-11-15 LAB
AFP-TM SERPL-MCNC: 4.3 NG/ML
ALBUMIN SERPL ELPH-MCNC: 4.3 G/DL
ALP BLD-CCNC: 96 U/L
ALT SERPL-CCNC: 59 U/L
AST SERPL-CCNC: 36 U/L
BILIRUB DIRECT SERPL-MCNC: 0.1 MG/DL
BILIRUB INDIRECT SERPL-MCNC: 0.1 MG/DL
BILIRUB SERPL-MCNC: 0.2 MG/DL
GGT SERPL-CCNC: 57 U/L
PROT SERPL-MCNC: 7.9 G/DL

## 2020-12-24 NOTE — ED PROVIDER NOTE - CHIEF COMPLAINT
Patient seen, chart reviewed, note dictated. [id number] 
 
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 Now admitted with leaking nephrostomy tube. 1. Metastatic recurrence of H&N CA: This young patient deserves a chance to be exposed to PDL-1 treatment. This is FDA approved for recurrent disease and has a healthy asymptote meaning that a significant percent of patients have prolonged survival (see below). I have set him up for treatment in my office next week. Okay for discharge from my standpoint. Pain controlled. Edenilson Lozano MD 
Hem/Onc 
[phone] The patient is a 52y Female complaining of flank pain.

## 2020-12-27 ENCOUNTER — RX RENEWAL (OUTPATIENT)
Age: 55
End: 2020-12-27

## 2021-01-12 ENCOUNTER — APPOINTMENT (OUTPATIENT)
Dept: INTERNAL MEDICINE | Facility: CLINIC | Age: 56
End: 2021-01-12
Payer: MEDICAID

## 2021-01-12 VITALS
RESPIRATION RATE: 15 BRPM | TEMPERATURE: 98.4 F | HEART RATE: 74 BPM | HEIGHT: 62 IN | OXYGEN SATURATION: 99 % | SYSTOLIC BLOOD PRESSURE: 125 MMHG | DIASTOLIC BLOOD PRESSURE: 78 MMHG

## 2021-01-12 DIAGNOSIS — Z11.59 ENCOUNTER FOR SCREENING FOR OTHER VIRAL DISEASES: ICD-10-CM

## 2021-01-12 PROCEDURE — 99214 OFFICE O/P EST MOD 30 MIN: CPT | Mod: 25

## 2021-01-12 PROCEDURE — 36415 COLL VENOUS BLD VENIPUNCTURE: CPT

## 2021-01-12 PROCEDURE — 99072 ADDL SUPL MATRL&STAF TM PHE: CPT

## 2021-01-12 NOTE — PLAN
[FreeTextEntry1] : Screening for COVID-19 antibodies\par \par Screening for COVID-19 infection via PCR

## 2021-01-12 NOTE — HEALTH RISK ASSESSMENT
[Never (0 pts)] : Never (0 points) [No] : In the past 12 months have you used drugs other than those required for medical reasons? No [No falls in past year] : Patient reported no falls in the past year [0] : 2) Feeling down, depressed, or hopeless: Not at all (0) [] : No [Audit-CScore] : 0 [de-identified] : Sedentary [de-identified] : Regular [LPH2Fojyt] : 0

## 2021-01-12 NOTE — PHYSICAL EXAM
[No Acute Distress] : no acute distress [Well Nourished] : well nourished [Well Developed] : well developed [Well-Appearing] : well-appearing [Normal Sclera/Conjunctiva] : normal sclera/conjunctiva [PERRL] : pupils equal round and reactive to light [EOMI] : extraocular movements intact [Normal Outer Ear/Nose] : the outer ears and nose were normal in appearance [No JVD] : no jugular venous distention [No Lymphadenopathy] : no lymphadenopathy [Supple] : supple [Thyroid Normal, No Nodules] : the thyroid was normal and there were no nodules present [No Respiratory Distress] : no respiratory distress  [No Accessory Muscle Use] : no accessory muscle use [Clear to Auscultation] : lungs were clear to auscultation bilaterally [Normal Rate] : normal rate  [Regular Rhythm] : with a regular rhythm [Normal S1, S2] : normal S1 and S2 [No Murmur] : no murmur heard [No Carotid Bruits] : no carotid bruits [No Abdominal Bruit] : a ~M bruit was not heard ~T in the abdomen [No Varicosities] : no varicosities [Pedal Pulses Present] : the pedal pulses are present [No Edema] : there was no peripheral edema [No Palpable Aorta] : no palpable aorta [No Extremity Clubbing/Cyanosis] : no extremity clubbing/cyanosis [Soft] : abdomen soft [Non Tender] : non-tender [Non-distended] : non-distended [No Masses] : no abdominal mass palpated [No HSM] : no HSM [Normal Bowel Sounds] : normal bowel sounds [Normal Posterior Cervical Nodes] : no posterior cervical lymphadenopathy [Normal Anterior Cervical Nodes] : no anterior cervical lymphadenopathy [No CVA Tenderness] : no CVA  tenderness [No Spinal Tenderness] : no spinal tenderness [No Joint Swelling] : no joint swelling [Grossly Normal Strength/Tone] : grossly normal strength/tone [No Rash] : no rash [Coordination Grossly Intact] : coordination grossly intact [No Focal Deficits] : no focal deficits [Normal Gait] : normal gait [Deep Tendon Reflexes (DTR)] : deep tendon reflexes were 2+ and symmetric [Normal Affect] : the affect was normal [Normal Insight/Judgement] : insight and judgment were intact [de-identified] : mildly erythematous throat [de-identified] : deferred [FreeTextEntry1] : deferred

## 2021-01-12 NOTE — HISTORY OF PRESENT ILLNESS
[FreeTextEntry1] : Follow up  [de-identified] : CRYSTAL LI is a 55 year old female patient with a PMHx of COVID-19 infection, HLD, hypothyroidism, liver disease, and OA who presents today for follow up. Patient has a Hx of COVID-19 infection March 2020. Today, she complains of sore throat and sneezing. Patient relates that she was in contact with another individual who tested positive for COVID-19 infection on New Year's Day.

## 2021-01-15 LAB
SARS-COV-2 IGG SERPL IA-ACNC: 0.57 INDEX
SARS-COV-2 IGG SERPL QL IA: NEGATIVE
SARS-COV-2 N GENE NPH QL NAA+PROBE: NOT DETECTED

## 2021-05-18 ENCOUNTER — RX RENEWAL (OUTPATIENT)
Age: 56
End: 2021-05-18

## 2021-08-13 ENCOUNTER — APPOINTMENT (OUTPATIENT)
Dept: INTERNAL MEDICINE | Facility: CLINIC | Age: 56
End: 2021-08-13
Payer: MEDICAID

## 2021-08-13 VITALS
DIASTOLIC BLOOD PRESSURE: 83 MMHG | RESPIRATION RATE: 14 BRPM | SYSTOLIC BLOOD PRESSURE: 124 MMHG | HEIGHT: 61 IN | OXYGEN SATURATION: 95 % | HEART RATE: 75 BPM | TEMPERATURE: 98.9 F | WEIGHT: 195 LBS | BODY MASS INDEX: 36.82 KG/M2

## 2021-08-13 DIAGNOSIS — L30.9 DERMATITIS, UNSPECIFIED: ICD-10-CM

## 2021-08-13 DIAGNOSIS — R07.0 PAIN IN THROAT: ICD-10-CM

## 2021-08-13 PROCEDURE — 99396 PREV VISIT EST AGE 40-64: CPT | Mod: 25

## 2021-08-17 LAB
25(OH)D3 SERPL-MCNC: 38.6 NG/ML
ALBUMIN SERPL ELPH-MCNC: 4.5 G/DL
ALP BLD-CCNC: 76 U/L
ALT SERPL-CCNC: 45 U/L
ANION GAP SERPL CALC-SCNC: 16 MMOL/L
APPEARANCE: CLEAR
AST SERPL-CCNC: 35 U/L
BACTERIA: NEGATIVE
BASOPHILS # BLD AUTO: 0.03 K/UL
BASOPHILS NFR BLD AUTO: 0.4 %
BILIRUB SERPL-MCNC: 0.3 MG/DL
BILIRUBIN URINE: NEGATIVE
BLOOD URINE: NEGATIVE
BUN SERPL-MCNC: 14 MG/DL
CALCIUM OXALATE CRYSTALS: ABNORMAL
CALCIUM SERPL-MCNC: 9.5 MG/DL
CHLORIDE SERPL-SCNC: 101 MMOL/L
CHOLEST SERPL-MCNC: 234 MG/DL
CO2 SERPL-SCNC: 22 MMOL/L
COLOR: YELLOW
CREAT SERPL-MCNC: 0.84 MG/DL
EOSINOPHIL # BLD AUTO: 0.15 K/UL
EOSINOPHIL NFR BLD AUTO: 2.1 %
GLUCOSE QUALITATIVE U: NEGATIVE
GLUCOSE SERPL-MCNC: 67 MG/DL
HCT VFR BLD CALC: 45.6 %
HDLC SERPL-MCNC: 56 MG/DL
HGB BLD-MCNC: 14.4 G/DL
HYALINE CASTS: 0 /LPF
IMM GRANULOCYTES NFR BLD AUTO: 0.3 %
KETONES URINE: NEGATIVE
LDLC SERPL CALC-MCNC: 160 MG/DL
LEUKOCYTE ESTERASE URINE: NEGATIVE
LYMPHOCYTES # BLD AUTO: 2.16 K/UL
LYMPHOCYTES NFR BLD AUTO: 30.5 %
MAN DIFF?: NORMAL
MCHC RBC-ENTMCNC: 29.6 PG
MCHC RBC-ENTMCNC: 31.6 GM/DL
MCV RBC AUTO: 93.6 FL
MICROSCOPIC-UA: NORMAL
MONOCYTES # BLD AUTO: 0.68 K/UL
MONOCYTES NFR BLD AUTO: 9.6 %
NEUTROPHILS # BLD AUTO: 4.05 K/UL
NEUTROPHILS NFR BLD AUTO: 57.1 %
NITRITE URINE: NEGATIVE
NONHDLC SERPL-MCNC: 178 MG/DL
PH URINE: 6
PLATELET # BLD AUTO: 303 K/UL
POTASSIUM SERPL-SCNC: 4.8 MMOL/L
PROT SERPL-MCNC: 7.8 G/DL
PROTEIN URINE: NORMAL
RBC # BLD: 4.87 M/UL
RBC # FLD: 13.8 %
RED BLOOD CELLS URINE: 1 /HPF
SODIUM SERPL-SCNC: 139 MMOL/L
SPECIFIC GRAVITY URINE: 1.03
SQUAMOUS EPITHELIAL CELLS: 4 /HPF
T3 SERPL-MCNC: 90 NG/DL
T4 FREE SERPL-MCNC: 1.3 NG/DL
T4 SERPL-MCNC: 9 UG/DL
TRIGL SERPL-MCNC: 87 MG/DL
TSH SERPL-ACNC: 4.22 UIU/ML
UROBILINOGEN URINE: NORMAL
WBC # FLD AUTO: 7.09 K/UL
WHITE BLOOD CELLS URINE: 5 /HPF

## 2021-08-18 NOTE — REVIEW OF SYSTEMS
[Negative] : Heme/Lymph [FreeTextEntry4] : feeling something is stuck in throat [de-identified] : discolored area left side of the back

## 2021-08-18 NOTE — END OF VISIT
[FreeTextEntry3] : I, Yunior Hargrove, personally transcribed these services in the presence of Dr. Emma Winkler MD. I, Dr. Emma Winkler MD, personally performed the services described in this documentation as scribed by Yunior Hargrove in my presence and it is both accurate and complete.

## 2021-08-18 NOTE — PHYSICAL EXAM
[No Acute Distress] : no acute distress [Well Nourished] : well nourished [Well Developed] : well developed [Well-Appearing] : well-appearing [Normal Sclera/Conjunctiva] : normal sclera/conjunctiva [PERRL] : pupils equal round and reactive to light [EOMI] : extraocular movements intact [Normal Outer Ear/Nose] : the outer ears and nose were normal in appearance [Normal Oropharynx] : the oropharynx was normal [No JVD] : no jugular venous distention [No Lymphadenopathy] : no lymphadenopathy [Supple] : supple [Thyroid Normal, No Nodules] : the thyroid was normal and there were no nodules present [No Respiratory Distress] : no respiratory distress  [No Accessory Muscle Use] : no accessory muscle use [Clear to Auscultation] : lungs were clear to auscultation bilaterally [Normal Rate] : normal rate  [Regular Rhythm] : with a regular rhythm [Normal S1, S2] : normal S1 and S2 [No Murmur] : no murmur heard [No Carotid Bruits] : no carotid bruits [No Abdominal Bruit] : a ~M bruit was not heard ~T in the abdomen [No Varicosities] : no varicosities [Pedal Pulses Present] : the pedal pulses are present [No Edema] : there was no peripheral edema [No Palpable Aorta] : no palpable aorta [No Extremity Clubbing/Cyanosis] : no extremity clubbing/cyanosis [Normal Appearance] : normal in appearance [No Nipple Discharge] : no nipple discharge [No Axillary Lymphadenopathy] : no axillary lymphadenopathy [Soft] : abdomen soft [Non Tender] : non-tender [Non-distended] : non-distended [No Masses] : no abdominal mass palpated [No HSM] : no HSM [Normal Bowel Sounds] : normal bowel sounds [Normal Posterior Cervical Nodes] : no posterior cervical lymphadenopathy [Normal Anterior Cervical Nodes] : no anterior cervical lymphadenopathy [No CVA Tenderness] : no CVA  tenderness [No Spinal Tenderness] : no spinal tenderness [No Joint Swelling] : no joint swelling [Grossly Normal Strength/Tone] : grossly normal strength/tone [No Rash] : no rash [Coordination Grossly Intact] : coordination grossly intact [No Focal Deficits] : no focal deficits [Normal Gait] : normal gait [Deep Tendon Reflexes (DTR)] : deep tendon reflexes were 2+ and symmetric [Normal Affect] : the affect was normal [Normal Insight/Judgement] : insight and judgment were intact [FreeTextEntry1] : deferred [de-identified] : dark discolored are 8x7 cm left upper back

## 2021-08-18 NOTE — PLAN
[FreeTextEntry1] : Blood tests and urine sent to the lab\par \par EKG\par \par ENT evaluation\par \par Dermatology evaluation

## 2021-08-18 NOTE — HEALTH RISK ASSESSMENT
[Good] : ~his/her~  mood as  good [Never (0 pts)] : Never (0 points) [No] : In the past 12 months have you used drugs other than those required for medical reasons? No [No falls in past year] : Patient reported no falls in the past year [0] : 2) Feeling down, depressed, or hopeless: Not at all (0) [] : No [Audit-CScore] : 0 [de-identified] : Sedentary [de-identified] : Regular [HPI1Cnapw] : 0 [MammogramDate] : 10/2020 [PapSmearDate] : 10/2020 [BoneDensityDate] : 10/2020 [ColonoscopyDate] : 01/2018

## 2021-08-18 NOTE — HISTORY OF PRESENT ILLNESS
[FreeTextEntry1] : Physical examination  [de-identified] : CRYSTAL LI is a 56 year old female with a PMHx of gastritis, HLD, hypothyroidism, liver disease, and OA who presents today for  physical examination. She is feeling okay however she states that she has something in her throat.\par \par She also relates dark area left side of the back that is getting bigger.

## 2021-09-07 ENCOUNTER — RX RENEWAL (OUTPATIENT)
Age: 56
End: 2021-09-07

## 2021-09-22 ENCOUNTER — INPATIENT (INPATIENT)
Facility: HOSPITAL | Age: 56
LOS: 0 days | Discharge: HOME | DRG: 282 | End: 2021-09-23
Attending: HOSPITALIST | Admitting: STUDENT IN AN ORGANIZED HEALTH CARE EDUCATION/TRAINING PROGRAM
Payer: MEDICAID

## 2021-09-22 VITALS
TEMPERATURE: 98 F | SYSTOLIC BLOOD PRESSURE: 137 MMHG | OXYGEN SATURATION: 97 % | RESPIRATION RATE: 16 BRPM | HEIGHT: 62 IN | HEART RATE: 97 BPM | DIASTOLIC BLOOD PRESSURE: 86 MMHG | WEIGHT: 195.11 LBS

## 2021-09-22 LAB
ALBUMIN SERPL ELPH-MCNC: 4.1 G/DL — SIGNIFICANT CHANGE UP (ref 3.3–5)
ALP SERPL-CCNC: 71 U/L — SIGNIFICANT CHANGE UP (ref 40–120)
ALT FLD-CCNC: 34 U/L — SIGNIFICANT CHANGE UP (ref 10–45)
ANION GAP SERPL CALC-SCNC: 12 MMOL/L — SIGNIFICANT CHANGE UP (ref 5–17)
APTT BLD: 34.7 SEC — SIGNIFICANT CHANGE UP (ref 27.5–35.5)
AST SERPL-CCNC: 25 U/L — SIGNIFICANT CHANGE UP (ref 10–40)
BASOPHILS # BLD AUTO: 0.03 K/UL — SIGNIFICANT CHANGE UP (ref 0–0.2)
BASOPHILS NFR BLD AUTO: 0.4 % — SIGNIFICANT CHANGE UP (ref 0–2)
BILIRUB SERPL-MCNC: 0.2 MG/DL — SIGNIFICANT CHANGE UP (ref 0.2–1.2)
BUN SERPL-MCNC: 20 MG/DL — SIGNIFICANT CHANGE UP (ref 7–23)
CALCIUM SERPL-MCNC: 9.4 MG/DL — SIGNIFICANT CHANGE UP (ref 8.4–10.5)
CHLORIDE SERPL-SCNC: 106 MMOL/L — SIGNIFICANT CHANGE UP (ref 96–108)
CO2 SERPL-SCNC: 23 MMOL/L — SIGNIFICANT CHANGE UP (ref 22–31)
CREAT SERPL-MCNC: 0.85 MG/DL — SIGNIFICANT CHANGE UP (ref 0.5–1.3)
D DIMER BLD IA.RAPID-MCNC: <150 NG/ML DDU — SIGNIFICANT CHANGE UP
EOSINOPHIL # BLD AUTO: 0.14 K/UL — SIGNIFICANT CHANGE UP (ref 0–0.5)
EOSINOPHIL NFR BLD AUTO: 1.8 % — SIGNIFICANT CHANGE UP (ref 0–6)
GLUCOSE SERPL-MCNC: 143 MG/DL — HIGH (ref 70–99)
HCT VFR BLD CALC: 42.5 % — SIGNIFICANT CHANGE UP (ref 34.5–45)
HGB BLD-MCNC: 13.5 G/DL — SIGNIFICANT CHANGE UP (ref 11.5–15.5)
IMM GRANULOCYTES NFR BLD AUTO: 0.4 % — SIGNIFICANT CHANGE UP (ref 0–1.5)
INR BLD: 1.07 RATIO — SIGNIFICANT CHANGE UP (ref 0.88–1.16)
LIDOCAIN IGE QN: 47 U/L — SIGNIFICANT CHANGE UP (ref 7–60)
LYMPHOCYTES # BLD AUTO: 2.68 K/UL — SIGNIFICANT CHANGE UP (ref 1–3.3)
LYMPHOCYTES # BLD AUTO: 34.1 % — SIGNIFICANT CHANGE UP (ref 13–44)
MAGNESIUM SERPL-MCNC: 2.4 MG/DL — SIGNIFICANT CHANGE UP (ref 1.6–2.6)
MCHC RBC-ENTMCNC: 29.4 PG — SIGNIFICANT CHANGE UP (ref 27–34)
MCHC RBC-ENTMCNC: 31.8 GM/DL — LOW (ref 32–36)
MCV RBC AUTO: 92.6 FL — SIGNIFICANT CHANGE UP (ref 80–100)
MONOCYTES # BLD AUTO: 0.61 K/UL — SIGNIFICANT CHANGE UP (ref 0–0.9)
MONOCYTES NFR BLD AUTO: 7.8 % — SIGNIFICANT CHANGE UP (ref 2–14)
NEUTROPHILS # BLD AUTO: 4.36 K/UL — SIGNIFICANT CHANGE UP (ref 1.8–7.4)
NEUTROPHILS NFR BLD AUTO: 55.5 % — SIGNIFICANT CHANGE UP (ref 43–77)
NRBC # BLD: 0 /100 WBCS — SIGNIFICANT CHANGE UP (ref 0–0)
PLATELET # BLD AUTO: 287 K/UL — SIGNIFICANT CHANGE UP (ref 150–400)
POTASSIUM SERPL-MCNC: 3.5 MMOL/L — SIGNIFICANT CHANGE UP (ref 3.5–5.3)
POTASSIUM SERPL-SCNC: 3.5 MMOL/L — SIGNIFICANT CHANGE UP (ref 3.5–5.3)
PROT SERPL-MCNC: 7.6 G/DL — SIGNIFICANT CHANGE UP (ref 6–8.3)
PROTHROM AB SERPL-ACNC: 12.8 SEC — SIGNIFICANT CHANGE UP (ref 10.6–13.6)
RBC # BLD: 4.59 M/UL — SIGNIFICANT CHANGE UP (ref 3.8–5.2)
RBC # FLD: 13.5 % — SIGNIFICANT CHANGE UP (ref 10.3–14.5)
SODIUM SERPL-SCNC: 141 MMOL/L — SIGNIFICANT CHANGE UP (ref 135–145)
TROPONIN T, HIGH SENSITIVITY RESULT: 149 NG/L — HIGH (ref 0–51)
TROPONIN T, HIGH SENSITIVITY RESULT: 153 NG/L — HIGH (ref 0–51)
WBC # BLD: 7.85 K/UL — SIGNIFICANT CHANGE UP (ref 3.8–10.5)
WBC # FLD AUTO: 7.85 K/UL — SIGNIFICANT CHANGE UP (ref 3.8–10.5)

## 2021-09-22 PROCEDURE — 71045 X-RAY EXAM CHEST 1 VIEW: CPT | Mod: 26

## 2021-09-22 PROCEDURE — 99291 CRITICAL CARE FIRST HOUR: CPT

## 2021-09-22 PROCEDURE — 93010 ELECTROCARDIOGRAM REPORT: CPT

## 2021-09-22 RX ORDER — METOPROLOL TARTRATE 50 MG
12.5 TABLET ORAL ONCE
Refills: 0 | Status: COMPLETED | OUTPATIENT
Start: 2021-09-22 | End: 2021-09-22

## 2021-09-22 RX ORDER — ATORVASTATIN CALCIUM 80 MG/1
80 TABLET, FILM COATED ORAL AT BEDTIME
Refills: 0 | Status: DISCONTINUED | OUTPATIENT
Start: 2021-09-22 | End: 2021-09-23

## 2021-09-22 RX ORDER — TICAGRELOR 90 MG/1
180 TABLET ORAL ONCE
Refills: 0 | Status: COMPLETED | OUTPATIENT
Start: 2021-09-22 | End: 2021-09-22

## 2021-09-22 RX ORDER — HEPARIN SODIUM 5000 [USP'U]/ML
5600 INJECTION INTRAVENOUS; SUBCUTANEOUS EVERY 6 HOURS
Refills: 0 | Status: DISCONTINUED | OUTPATIENT
Start: 2021-09-22 | End: 2021-09-23

## 2021-09-22 RX ORDER — HEPARIN SODIUM 5000 [USP'U]/ML
5000 INJECTION INTRAVENOUS; SUBCUTANEOUS ONCE
Refills: 0 | Status: COMPLETED | OUTPATIENT
Start: 2021-09-22 | End: 2021-09-22

## 2021-09-22 RX ORDER — ASPIRIN/CALCIUM CARB/MAGNESIUM 324 MG
324 TABLET ORAL ONCE
Refills: 0 | Status: COMPLETED | OUTPATIENT
Start: 2021-09-22 | End: 2021-09-22

## 2021-09-22 RX ORDER — HEPARIN SODIUM 5000 [USP'U]/ML
INJECTION INTRAVENOUS; SUBCUTANEOUS
Qty: 25000 | Refills: 0 | Status: DISCONTINUED | OUTPATIENT
Start: 2021-09-22 | End: 2021-09-23

## 2021-09-22 RX ADMIN — HEPARIN SODIUM 1000 UNIT(S)/HR: 5000 INJECTION INTRAVENOUS; SUBCUTANEOUS at 22:07

## 2021-09-22 RX ADMIN — Medication 12.5 MILLIGRAM(S): at 22:05

## 2021-09-22 RX ADMIN — Medication 324 MILLIGRAM(S): at 21:32

## 2021-09-22 RX ADMIN — TICAGRELOR 180 MILLIGRAM(S): 90 TABLET ORAL at 22:05

## 2021-09-22 RX ADMIN — HEPARIN SODIUM 5000 UNIT(S): 5000 INJECTION INTRAVENOUS; SUBCUTANEOUS at 22:05

## 2021-09-22 RX ADMIN — ATORVASTATIN CALCIUM 80 MILLIGRAM(S): 80 TABLET, FILM COATED ORAL at 22:04

## 2021-09-22 NOTE — ED PROVIDER NOTE - PROGRESS NOTE DETAILS
Lab called trop of 153 with normal renal function, spoke with charge to bring patient back immediately Lab called trop of 153 with normal renal function, spoke with charge to bring patient back immediately.  Humphrey

## 2021-09-22 NOTE — ED ADULT NURSE NOTE - OBJECTIVE STATEMENT
Pt is a 55 y/o female presents to the ED complaining of chest pain that started last night and woke her up from sleep. Pt states she has had episodes of chest pain before that lasted for a few seconds, but this time she had palpitations that woke her up from sleep, she briefly had blurry vision, felt nauseous & diaphoretic. Describes pain as pressure that is substernal that radiates to b/l arms. Pt state symptoms have since resolved. Pt presents alert & oriented x 4, calm, able to follow commands, speech clear. Breathing spontaneous & nonlabored. On cardiac monitor, NSR 90's. Abdomen soft & nondistended. Strength 5/5 x 4 extremities, ambulates without assist. Strong peripheral pulses noted b/l, no edema noted. Skin warm, clean, dry & intact. Denies  SOB, abdominal pain, back pain, n/v/d, fever, chills, changes in vision. Call bell within reach, bed in lowest position, side rails up, wheels locked.

## 2021-09-22 NOTE — ED ADULT NURSE REASSESSMENT NOTE - NS ED NURSE REASSESS COMMENT FT1
Heparin infusion ordered by MD following ACS nomogram. Pt weighed. Pt has two patent IVL. Pt explained signs & symptoms of bleeding to make RN aware of. Heparin started at 22:07 with 2nd Cordero at 10m ml/hr. On cardiac monitor, LAKISHA Heparin infusion ordered by MD following ACS nomogram. Pt weighed. Pt has two patent IVL. Coagulants resulted. Pt explained signs & symptoms of bleeding to make RN aware of. Heparin started at 22:07 with 2nd Cordero at 10m ml/hr. On cardiac monitor, LAKISHA

## 2021-09-22 NOTE — ED PROVIDER NOTE - PHYSICAL EXAMINATION
CONSTITUTIONAL: Well-developed; well-nourished; in no acute distress.   SKIN: warm, dry  HEAD: Normocephalic; atraumatic.  EYES: no conjunctival injection.   ENT: No nasal discharge; airway clear.  NECK: Supple; non tender.  CARD: S1, S2 normal; no murmurs, gallops, or rubs. Regular rate and rhythm.   RESP: No wheezes, rales or rhonchi. Good air movement bilaterally.   ABD: soft ntnd, no guarding, no distention, no rigidity.   EXT: Ambulates independently.  No cyanosis or edema.   NEURO: Alert, oriented, grossly unremarkable  PSYCH: Cooperative, appropriate.

## 2021-09-22 NOTE — CONSULT NOTE ADULT - ASSESSMENT
57 yo F w/ PMH hypothyroidism and HLD who presents with chest pain concerning for NSTEMI.     #NSTEMI   #Chest pain   Chest pain likely NSTEMI, low suspicon for PE given D-dimer negative or dissection or pneumo. EKG unrevealing.   -Continue aspirin 81mg daily   -Start brilinta load   -Start atorvastatin 80mg   -Start metoprolol 12.5mg BID   -Plan for cath in morning   -Trend trop to peak  57 yo F w/ PMH hypothyroidism and HLD who presents with chest pain concerning for NSTEMI.     #NSTEMI   #Chest pain   Chest pain likely NSTEMI, low suspicon for PE given D-dimer negative or dissection or pneumo. EKG unrevealing.   -Continue aspirin 81mg daily   -Start brilinta load   -Start atorvastatin 80mg   -Start metoprolol 12.5mg BID   -Start heparin gtt   -Plan for cath in morning   -Trend trop to peak

## 2021-09-22 NOTE — CONSULT NOTE ADULT - SUBJECTIVE AND OBJECTIVE BOX
Patient seen and evaluated at bedside    Chief Complaint:    HPI:  57 yo F w/ PMH hypothyroidism and HLD who presents with chest pain. Says overnight, she woke up with chest pain. Also felt back pain and arm pain along with palpitations. Was constant. Never had this before. In the morning, it improved but became intermitent. Not related to exertion. Current asymptomatic. Also initially had dizziness and blurriness in vision but now resolved. No cardiac hx or surgery. No family hx of cardiac disease. Denies smoking, drinking, drug use.     EKG shows sinus rhythm, no NATASHA or T wave inversions.     PMHx:   No pertinent past medical history        PSHx:   No significant past surgical history        Allergies:  No Known Allergies      Home Meds:    Current Medications:   atorvastatin 80 milliGRAM(s) Oral at bedtime  heparin   Injectable 5000 Unit(s) IV Push once  heparin   Injectable 5600 Unit(s) IV Push every 6 hours PRN  heparin  Infusion.  Unit(s)/Hr IV Continuous <Continuous>  metoprolol tartrate 12.5 milliGRAM(s) Oral Once  ticagrelor 180 milliGRAM(s) Oral Once      FAMILY HISTORY:      Social History:  Smoking History:  Alcohol Use:  Drug Use:    REVIEW OF SYSTEMS:  Constitutional:     [x ] negative [ ] fevers [ ] chills [ ] weight loss [ ] weight gain  HEENT:                  [x ] negative [ ] dry eyes [ ] eye irritation [ ] postnasal drip [ ] nasal congestion  CV:                         [ x] negative  [ ] chest pain [ ] orthopnea [ ] palpitations [ ] murmur  Resp:                     [x ] negative [ ] cough [ ] shortness of breath [ ] dyspnea [ ] wheezing [ ] sputum [ ]hemoptysis  GI:                          [ x] negative [ ] nausea [ ] vomiting [ ] diarrhea [ ] constipation [ ] abd pain [ ] dysphagia   :                        [ x] negative [ ] dysuria [ ] nocturia [ ] hematuria [ ] increased urinary frequency  Musculoskeletal: [x ] negative [ ] back pain [ ] myalgias [ ] arthralgias [ ] fracture  Skin:                       [ x] negative [ ] rash [ ] itch  Neurological:        [ x] negative [ ] headache [ ] dizziness [ ] syncope [ ] weakness [ ] numbness  Psychiatric:           [ x] negative [ ] anxiety [ ] depression  Endocrine:            [ x] negative [ ] diabetes [ ] thyroid problem  Heme/Lymph:      [ x] negative [ ] anemia [ ] bleeding problem  Allergic/Immune: [ x] negative [ ] itchy eyes [ ] nasal discharge [ ] hives [ ] angioedema    [ x] All other systems negative  [ ] Unable to assess ROS due to      Physical Exam:  T(F): 97.9 (09-22), Max: 97.9 (09-22)  HR: 85 (09-22) (85 - 97)  BP: 132/80 (09-22) (132/80 - 137/86)  RR: 14 (09-22)  SpO2: 100% (09-22)  GENERAL: No acute distress, well-developed  HEAD:  Atraumatic, Normocephalic  ENT: No JVD   CHEST/LUNG: Clear to auscultation bilaterally; No wheeze, equal breath sounds bilaterally   HEART: Regular rate and rhythm; No murmurs, rubs, or gallops  ABDOMEN: Soft, Nontender, Nondistended; Bowel sounds present  EXTREMITIES:  No clubbing, cyanosis, or edema  PSYCH: Nl behavior, nl affect  NEUROLOGY: AAOx3, non-focal   SKIN: Normal color, No rashes or lesions  LINES:    Cardiovascular Diagnostic Testing:    ECG: Personally reviewed:    Echo: Personally reviewed:    Stress Testing:    Cath:    Imaging:    CXR: Personally reviewed    Labs: Personally reviewed                        13.5   7.85  )-----------( 287      ( 22 Sep 2021 20:03 )             42.5     09-22    141  |  106  |  20  ----------------------------<  143<H>  3.5   |  23  |  0.85    Ca    9.4      22 Sep 2021 20:03  Mg     2.4     09-22    TPro  7.6  /  Alb  4.1  /  TBili  0.2  /  DBili  x   /  AST  25  /  ALT  34  /  AlkPhos  71  09-22    PT/INR - ( 22 Sep 2021 21:32 )   PT: 12.8 sec;   INR: 1.07 ratio         PTT - ( 22 Sep 2021 21:32 )  PTT:34.7 sec

## 2021-09-22 NOTE — ED PROVIDER NOTE - RAPID ASSESSMENT
65 y/o F presents to ED c/o upper back and CP. Pt reports she woke up last night with CP and back pain. Back pain >CP. Pain worse with deep breaths. Similar Sx in the past but never this persistent.    Patient was seen as a tele QDOC patient. The patient will be seen and further worked up in the main emergency department and their care will be completed by the main emergency department team along with a thorough physical exam. Receiving team will follow up on labs, analgesia, any clinical imaging, reassess and disposition as clinically indicated, all decisions regarding the progression of care will be made at their discretion.     Scribe Statement: Daniel CARPENTER, attest that this documentation has been prepared under the direction and in the presence of Zachary Yin (PA) 65 y/o F presents to ED c/o upper back and CP. Pt reports she woke up last night with CP and back pain. Back pain >CP. Pain worse with deep breaths. Similar Sx in the past but never this persistent.    Patient was seen as a tele QDOC patient. The patient will be seen and further worked up in the main emergency department and their care will be completed by the main emergency department team along with a thorough physical exam. Receiving team will follow up on labs, analgesia, any clinical imaging, reassess and disposition as clinically indicated, all decisions regarding the progression of care will be made at their discretion.     Scribe Statement: Daniel CARPENTER, attest that this documentation has been prepared under the direction and in the presence of Zachary Yin (PA)  Zachary CARPENTER, personally performed the service described in the documentation recorded by the scribe in my presence, and it accurately and completely records my words and actions.

## 2021-09-22 NOTE — ED PROVIDER NOTE - ATTENDING CONTRIBUTION TO CARE
64yr F hx of HL p/w intermittent chest pain since last night. worse and non stop last night but has abated somewhat today. denies sob, exertionality or positional pain. denies new leg swelling. had diaphoresis and nausea last night.   exam notable for no acute distress, clear lungs. s1-2, soft abd no peripheral edema  ekg non ischemic however trop elevated, suspect NSTEMI with no active ongoing ischemia w no sx. will call cards, asa load and likely admit w heparin and ticagrelor.

## 2021-09-22 NOTE — CONSULT NOTE ADULT - ATTENDING COMMENTS
56 year old woman with hypertension, no prior cardiac history had onset of chest pain, radiating to back and palpitations. Pain now resolved. Exam unremarkable, no findings on EKG, but troponin elevated with significant delta. Plan coronary angiography.    To contact call Cardiology Fellow or Attending as listed on [email or web address] password: [email or web address].

## 2021-09-22 NOTE — ED PROVIDER NOTE - CLINICAL SUMMARY MEDICAL DECISION MAKING FREE TEXT BOX
O'Gayle DO PGY-2: pt p/w chest and back pain. Trop 152. EKG NSR w/o ST changes. Ordered coags, aspirin in addition to qPA labs. Ordered another EKG and rpt trop. Will consult cards. TBA

## 2021-09-22 NOTE — ED PROVIDER NOTE - NS ED ROS FT
CONSTITUTIONAL - No fever, No diaphoresis, No weight change  EYES - No eye pain, +blurred vision  ENT - No change in hearing, No sore throat, No neck pain, No rhinorrhea, No ear pain  RESPIRATORY - No shortness of breath, No cough  CARDIAC +chest pain, No palpitations  GI - No abdominal pain, No nausea, No vomiting, No diarrhea, No constipation  - No dysuria, no frequency, no hematuria.   MUSCULOSKELETAL - No joint pain, No swelling, No back pain  NEUROLOGIC - No numbness, +gen weakness, No headache, No dizziness

## 2021-09-22 NOTE — ED PROVIDER NOTE - OBJECTIVE STATEMENT
57 y/o F w/ hx of HLD and hypothyroidism p/w back and chest pain that began yesterday. Pt woke her up from sleep. Lasted minutes. Sensation was accompanied by generalized weakness and blurry vision. Pain again happened today a few times while the pt was at rest. Pt has been covid vaccinated. Pt has not taken any aspirin today. Pt has no cardiac hx. Denies tobacco use.

## 2021-09-23 ENCOUNTER — TRANSCRIPTION ENCOUNTER (OUTPATIENT)
Age: 56
End: 2021-09-23

## 2021-09-23 VITALS
RESPIRATION RATE: 15 BRPM | SYSTOLIC BLOOD PRESSURE: 127 MMHG | DIASTOLIC BLOOD PRESSURE: 73 MMHG | HEART RATE: 65 BPM | OXYGEN SATURATION: 97 %

## 2021-09-23 DIAGNOSIS — Z29.9 ENCOUNTER FOR PROPHYLACTIC MEASURES, UNSPECIFIED: ICD-10-CM

## 2021-09-23 DIAGNOSIS — R00.2 PALPITATIONS: ICD-10-CM

## 2021-09-23 DIAGNOSIS — E03.9 HYPOTHYROIDISM, UNSPECIFIED: ICD-10-CM

## 2021-09-23 DIAGNOSIS — I21.4 NON-ST ELEVATION (NSTEMI) MYOCARDIAL INFARCTION: ICD-10-CM

## 2021-09-23 DIAGNOSIS — E78.5 HYPERLIPIDEMIA, UNSPECIFIED: ICD-10-CM

## 2021-09-23 LAB
A1C WITH ESTIMATED AVERAGE GLUCOSE RESULT: 5.6 % — SIGNIFICANT CHANGE UP (ref 4–5.6)
ALBUMIN SERPL ELPH-MCNC: 3.9 G/DL — SIGNIFICANT CHANGE UP (ref 3.3–5)
ALP SERPL-CCNC: 67 U/L — SIGNIFICANT CHANGE UP (ref 40–120)
ALT FLD-CCNC: 32 U/L — SIGNIFICANT CHANGE UP (ref 10–45)
ANION GAP SERPL CALC-SCNC: 12 MMOL/L — SIGNIFICANT CHANGE UP (ref 5–17)
APTT BLD: 134.7 SEC — CRITICAL HIGH (ref 27.5–35.5)
APTT BLD: 51.6 SEC — HIGH (ref 27.5–35.5)
AST SERPL-CCNC: 25 U/L — SIGNIFICANT CHANGE UP (ref 10–40)
BILIRUB SERPL-MCNC: 0.5 MG/DL — SIGNIFICANT CHANGE UP (ref 0.2–1.2)
BUN SERPL-MCNC: 13 MG/DL — SIGNIFICANT CHANGE UP (ref 7–23)
CALCIUM SERPL-MCNC: 9 MG/DL — SIGNIFICANT CHANGE UP (ref 8.4–10.5)
CHLORIDE SERPL-SCNC: 106 MMOL/L — SIGNIFICANT CHANGE UP (ref 96–108)
CK MB BLD-MCNC: 2.9 % — SIGNIFICANT CHANGE UP (ref 0–3.5)
CK MB CFR SERPL CALC: 4.1 NG/ML — HIGH (ref 0–3.8)
CK SERPL-CCNC: 140 U/L — SIGNIFICANT CHANGE UP (ref 25–170)
CO2 SERPL-SCNC: 21 MMOL/L — LOW (ref 22–31)
CREAT SERPL-MCNC: 0.63 MG/DL — SIGNIFICANT CHANGE UP (ref 0.5–1.3)
ESTIMATED AVERAGE GLUCOSE: 114 MG/DL — SIGNIFICANT CHANGE UP (ref 68–114)
GLUCOSE SERPL-MCNC: 89 MG/DL — SIGNIFICANT CHANGE UP (ref 70–99)
HCT VFR BLD CALC: 41 % — SIGNIFICANT CHANGE UP (ref 34.5–45)
HCT VFR BLD CALC: 43.6 % — SIGNIFICANT CHANGE UP (ref 34.5–45)
HGB BLD-MCNC: 13.5 G/DL — SIGNIFICANT CHANGE UP (ref 11.5–15.5)
HGB BLD-MCNC: 13.8 G/DL — SIGNIFICANT CHANGE UP (ref 11.5–15.5)
INR BLD: 1.11 RATIO — SIGNIFICANT CHANGE UP (ref 0.88–1.16)
MCHC RBC-ENTMCNC: 29.9 PG — SIGNIFICANT CHANGE UP (ref 27–34)
MCHC RBC-ENTMCNC: 29.9 PG — SIGNIFICANT CHANGE UP (ref 27–34)
MCHC RBC-ENTMCNC: 31.7 GM/DL — LOW (ref 32–36)
MCHC RBC-ENTMCNC: 32.9 GM/DL — SIGNIFICANT CHANGE UP (ref 32–36)
MCV RBC AUTO: 90.9 FL — SIGNIFICANT CHANGE UP (ref 80–100)
MCV RBC AUTO: 94.6 FL — SIGNIFICANT CHANGE UP (ref 80–100)
NRBC # BLD: 0 /100 WBCS — SIGNIFICANT CHANGE UP (ref 0–0)
NRBC # BLD: 0 /100 WBCS — SIGNIFICANT CHANGE UP (ref 0–0)
PLATELET # BLD AUTO: 240 K/UL — SIGNIFICANT CHANGE UP (ref 150–400)
PLATELET # BLD AUTO: 246 K/UL — SIGNIFICANT CHANGE UP (ref 150–400)
POTASSIUM SERPL-MCNC: 4.1 MMOL/L — SIGNIFICANT CHANGE UP (ref 3.5–5.3)
POTASSIUM SERPL-SCNC: 4.1 MMOL/L — SIGNIFICANT CHANGE UP (ref 3.5–5.3)
PROT SERPL-MCNC: 7.3 G/DL — SIGNIFICANT CHANGE UP (ref 6–8.3)
PROTHROM AB SERPL-ACNC: 13.2 SEC — SIGNIFICANT CHANGE UP (ref 10.6–13.6)
RBC # BLD: 4.51 M/UL — SIGNIFICANT CHANGE UP (ref 3.8–5.2)
RBC # BLD: 4.61 M/UL — SIGNIFICANT CHANGE UP (ref 3.8–5.2)
RBC # FLD: 13.4 % — SIGNIFICANT CHANGE UP (ref 10.3–14.5)
RBC # FLD: 13.5 % — SIGNIFICANT CHANGE UP (ref 10.3–14.5)
SARS-COV-2 RNA SPEC QL NAA+PROBE: SIGNIFICANT CHANGE UP
SODIUM SERPL-SCNC: 139 MMOL/L — SIGNIFICANT CHANGE UP (ref 135–145)
TROPONIN T, HIGH SENSITIVITY RESULT: 94 NG/L — HIGH (ref 0–51)
TSH SERPL-MCNC: 5.21 UIU/ML — HIGH (ref 0.27–4.2)
WBC # BLD: 7.99 K/UL — SIGNIFICANT CHANGE UP (ref 3.8–10.5)
WBC # BLD: 8.32 K/UL — SIGNIFICANT CHANGE UP (ref 3.8–10.5)
WBC # FLD AUTO: 7.99 K/UL — SIGNIFICANT CHANGE UP (ref 3.8–10.5)
WBC # FLD AUTO: 8.32 K/UL — SIGNIFICANT CHANGE UP (ref 3.8–10.5)

## 2021-09-23 PROCEDURE — 93454 CORONARY ARTERY ANGIO S&I: CPT

## 2021-09-23 PROCEDURE — 82550 ASSAY OF CK (CPK): CPT

## 2021-09-23 PROCEDURE — C1887: CPT

## 2021-09-23 PROCEDURE — 82553 CREATINE MB FRACTION: CPT

## 2021-09-23 PROCEDURE — 71045 X-RAY EXAM CHEST 1 VIEW: CPT

## 2021-09-23 PROCEDURE — 84484 ASSAY OF TROPONIN QUANT: CPT

## 2021-09-23 PROCEDURE — 83735 ASSAY OF MAGNESIUM: CPT

## 2021-09-23 PROCEDURE — U0003: CPT

## 2021-09-23 PROCEDURE — 99223 1ST HOSP IP/OBS HIGH 75: CPT

## 2021-09-23 PROCEDURE — 85610 PROTHROMBIN TIME: CPT

## 2021-09-23 PROCEDURE — C1769: CPT

## 2021-09-23 PROCEDURE — 99152 MOD SED SAME PHYS/QHP 5/>YRS: CPT

## 2021-09-23 PROCEDURE — 99233 SBSQ HOSP IP/OBS HIGH 50: CPT

## 2021-09-23 PROCEDURE — 99291 CRITICAL CARE FIRST HOUR: CPT

## 2021-09-23 PROCEDURE — 12345: CPT | Mod: NC

## 2021-09-23 PROCEDURE — 85025 COMPLETE CBC W/AUTO DIFF WBC: CPT

## 2021-09-23 PROCEDURE — 93454 CORONARY ARTERY ANGIO S&I: CPT | Mod: 26

## 2021-09-23 PROCEDURE — 85027 COMPLETE CBC AUTOMATED: CPT

## 2021-09-23 PROCEDURE — U0005: CPT

## 2021-09-23 PROCEDURE — 85730 THROMBOPLASTIN TIME PARTIAL: CPT

## 2021-09-23 PROCEDURE — 83690 ASSAY OF LIPASE: CPT

## 2021-09-23 PROCEDURE — 99255 IP/OBS CONSLTJ NEW/EST HI 80: CPT | Mod: GC

## 2021-09-23 PROCEDURE — 85379 FIBRIN DEGRADATION QUANT: CPT

## 2021-09-23 PROCEDURE — 83036 HEMOGLOBIN GLYCOSYLATED A1C: CPT

## 2021-09-23 PROCEDURE — 80061 LIPID PANEL: CPT

## 2021-09-23 PROCEDURE — 80053 COMPREHEN METABOLIC PANEL: CPT

## 2021-09-23 PROCEDURE — C1894: CPT

## 2021-09-23 PROCEDURE — 84443 ASSAY THYROID STIM HORMONE: CPT

## 2021-09-23 PROCEDURE — 36415 COLL VENOUS BLD VENIPUNCTURE: CPT

## 2021-09-23 PROCEDURE — 96374 THER/PROPH/DIAG INJ IV PUSH: CPT

## 2021-09-23 RX ORDER — LEVOTHYROXINE SODIUM 125 MCG
75 TABLET ORAL DAILY
Refills: 0 | Status: DISCONTINUED | OUTPATIENT
Start: 2021-09-23 | End: 2021-09-23

## 2021-09-23 RX ORDER — INFLUENZA VIRUS VACCINE 15; 15; 15; 15 UG/.5ML; UG/.5ML; UG/.5ML; UG/.5ML
0.5 SUSPENSION INTRAMUSCULAR ONCE
Refills: 0 | Status: COMPLETED | OUTPATIENT
Start: 2021-09-23 | End: 2021-09-23

## 2021-09-23 RX ORDER — SIMVASTATIN 20 MG/1
0.5 TABLET, FILM COATED ORAL
Qty: 30 | Refills: 2
Start: 2021-09-23 | End: 2021-12-21

## 2021-09-23 RX ORDER — ASPIRIN/CALCIUM CARB/MAGNESIUM 324 MG
81 TABLET ORAL DAILY
Refills: 0 | Status: DISCONTINUED | OUTPATIENT
Start: 2021-09-23 | End: 2021-09-23

## 2021-09-23 RX ORDER — METOPROLOL TARTRATE 50 MG
12.5 TABLET ORAL
Refills: 0 | Status: DISCONTINUED | OUTPATIENT
Start: 2021-09-23 | End: 2021-09-23

## 2021-09-23 RX ORDER — TICAGRELOR 90 MG/1
90 TABLET ORAL ONCE
Refills: 0 | Status: COMPLETED | OUTPATIENT
Start: 2021-09-23 | End: 2021-09-23

## 2021-09-23 RX ORDER — TICAGRELOR 90 MG/1
60 TABLET ORAL EVERY 12 HOURS
Refills: 0 | Status: DISCONTINUED | OUTPATIENT
Start: 2021-09-23 | End: 2021-09-23

## 2021-09-23 RX ORDER — TICAGRELOR 90 MG/1
90 TABLET ORAL EVERY 12 HOURS
Refills: 0 | Status: DISCONTINUED | OUTPATIENT
Start: 2021-09-23 | End: 2021-09-23

## 2021-09-23 RX ORDER — SIMVASTATIN 20 MG/1
1 TABLET, FILM COATED ORAL
Qty: 0 | Refills: 0 | DISCHARGE

## 2021-09-23 RX ADMIN — HEPARIN SODIUM 0 UNIT(S)/HR: 5000 INJECTION INTRAVENOUS; SUBCUTANEOUS at 04:32

## 2021-09-23 RX ADMIN — HEPARIN SODIUM 700 UNIT(S)/HR: 5000 INJECTION INTRAVENOUS; SUBCUTANEOUS at 05:06

## 2021-09-23 RX ADMIN — Medication 75 MICROGRAM(S): at 08:52

## 2021-09-23 RX ADMIN — Medication 81 MILLIGRAM(S): at 08:52

## 2021-09-23 RX ADMIN — Medication 12.5 MILLIGRAM(S): at 10:29

## 2021-09-23 RX ADMIN — TICAGRELOR 90 MILLIGRAM(S): 90 TABLET ORAL at 10:20

## 2021-09-23 NOTE — H&P ADULT - PROBLEM SELECTOR PLAN 2
- tele monitor for arrhythmia  - will treat with Metoprolol for now  - optimize electrolytes (K>4, Mg>2)

## 2021-09-23 NOTE — DISCHARGE NOTE PROVIDER - NSDCCPCAREPLAN_GEN_ALL_CORE_FT
PRINCIPAL DISCHARGE DIAGNOSIS  Diagnosis: NSTEMI (non-ST elevation myocardial infarction)  Assessment and Plan of Treatment: no blockages found; continue your current medications

## 2021-09-23 NOTE — DISCHARGE NOTE PROVIDER - NSDCCPTREATMENT_GEN_ALL_CORE_FT
PRINCIPAL PROCEDURE  Procedure: Left heart catheterization  Findings and Treatment: diagnostic via RFA; normal coronary arteries

## 2021-09-23 NOTE — DISCHARGE NOTE PROVIDER - PROVIDER TOKENS
PROVIDER:[TOKEN:[[id number]],SCHEDULEDAPPT:[09/27/2021],SCHEDULEDAPPTTIME:[02:45 PM],ESTABLISHEDPATIENT:[T]]

## 2021-09-23 NOTE — PROGRESS NOTE ADULT - SUBJECTIVE AND OBJECTIVE BOX
[facility] Division of Hospital Medicine  Manju Flores MD  Pager (M-F, 6F-5P): [phone]  Other Times:  [phone]    Patient is a 56y old  Female who presents with a chief complaint of chest pain (23 Sep 2021 11:55)      SUBJECTIVE / OVERNIGHT EVENTS:  no acute events overnight  feels well today, no chest pain  no shortness of breath/nausea/vomiting/bleeding    ADDITIONAL REVIEW OF SYSTEMS:    MEDICATIONS  (STANDING):  aspirin  chewable 81 milliGRAM(s) Oral daily  levothyroxine 75 MICROGram(s) Oral daily  metoprolol tartrate 12.5 milliGRAM(s) Oral two times a day    MEDICATIONS  (PRN):      CAPILLARY BLOOD GLUCOSE        I&O's Summary      PHYSICAL EXAM:  Vital Signs Last 24 Hrs  T(C): 36.5 (23 Sep 2021 11:30), Max: 36.7 (23 Sep 2021 09:39)  T(F): 97.7 (23 Sep 2021 11:30), Max: 98.1 (23 Sep 2021 09:39)  HR: 60 (23 Sep 2021 12:15) (59 - 97)  BP: 127/74 (23 Sep 2021 12:15) (99/84 - 137/86)  BP(mean): 90 (23 Sep 2021 12:15) (82 - 95)  RR: 12 (23 Sep 2021 12:15) (12 - 20)  SpO2: 95% (23 Sep 2021 12:15) (95% - 100%)  CONSTITUTIONAL: NAD, well-developed, well-groomed, obese  EYES: conjunctiva and sclera clear  ENMT: Moist oral mucosa, no pharyngeal injection or exudates; normal dentition  NECK: Supple  RESPIRATORY: Normal respiratory effort; lungs are clear to auscultation bilaterally  CARDIOVASCULAR: Regular rate and rhythm, normal S1 and S2, no murmur/rub/gallop; No lower extremity edema; Peripheral pulses are 2+ bilaterally. Femoral sheath still in during my exam  ABDOMEN: Nontender to palpation, normoactive bowel sounds, no rebound/guarding; No hepatosplenomegaly  PSYCH: A+O to person, place, and time; affect appropriate    LABS:                        13.8   8.32  )-----------( 240      ( 23 Sep 2021 09:23 )             43.6     09-23    139  |  106  |  13  ----------------------------<  89  4.1   |  21<L>  |  0.63    Ca    9.0      23 Sep 2021 09:24  Mg     2.4     09-22    TPro  7.3  /  Alb  3.9  /  TBili  0.5  /  DBili  x   /  AST  25  /  ALT  32  /  AlkPhos  67  09-23    PT/INR - ( 23 Sep 2021 09:23 )   PT: 13.2 sec;   INR: 1.11 ratio         PTT - ( 23 Sep 2021 09:23 )  PTT:51.6 sec  CARDIAC MARKERS ( 23 Sep 2021 09:24 )  x     / x     / 140 U/L / x     / 4.1 ng/mL            RADIOLOGY & ADDITIONAL TESTS:  Results Reviewed:   Imaging Personally Reviewed:  Electrocardiogram Personally Reviewed:    COORDINATION OF CARE:  Care Discussed with Consultants/Other Providers [Y/N]:  Prior or Outpatient Records Reviewed [Y/N]:

## 2021-09-23 NOTE — H&P ADULT - NSICDXPASTMEDICALHX_GEN_ALL_CORE_FT
PAST MEDICAL HISTORY:  No pertinent past medical history      PAST MEDICAL HISTORY:  Hyperlipidemia     Hypothyroidism     No pertinent past medical history

## 2021-09-23 NOTE — H&P ADULT - ADDITIONAL PE
T(F): 97.8 (23 Sep 2021 02:49), Max: 97.9 (22 Sep 2021 21:34)  HR: 64 (23 Sep 2021 02:49) (64 - 97)  BP: 123/86 (23 Sep 2021 02:49) (108/72 - 137/86)  RR: 15 (23 Sep 2021 02:49) (14 - 16)  SpO2: 100% (23 Sep 2021 02:49) (97% - 100%)

## 2021-09-23 NOTE — PROGRESS NOTE ADULT - PROBLEM SELECTOR PLAN 1
- tele monitor, serial cardiac enzymes  - treated with ASA, Brilinta load,, Hep gtt, Metoprolol and high intensity statin until she underwent cath which showed clean coronaries. Troponins downtrended  -unlikely cardiac origin of pain, which had already resolved.  CXR clear.  Vital signs stable. Cleared for d/c by cardiology

## 2021-09-23 NOTE — PROGRESS NOTE ADULT - ASSESSMENT
57 y/o F Hx obesity (BMI36), hypothyroid, HLD, GERD, OA p/w palpitation and chest pressure a/w concern for NSTEMI with elevated troponins, s/p cath showing clean coronaries cleared for d/c home by cardiology.

## 2021-09-23 NOTE — DISCHARGE NOTE PROVIDER - CARE PROVIDER_API CALL
Emma Winkler)  [facility]  [address] [place] [address], [address], [address] [place] [address]  Phone: [phone]  Fax: [phone]  Established Patient  Scheduled Appointment: 09/27/2021 02:45 PM

## 2021-09-23 NOTE — DISCHARGE NOTE PROVIDER - HOSPITAL COURSE
57 yo F w/ PMH obesity, hypothyroidism, HLD, gastritis, OA presents with chest pain.  Patient was awaken the night prior by palpitations about midnight , followed by chest "tightness" across her upper chest, then radiated to L shoulder/arm/back and jaws, intermittently all night, associated with transient lightheadedness and "a second of blurry vision".  Symptoms improved the next morning, went to work but felt exhausted.  Patient had recurrent symptoms (but milder, and much brief episodes of palpitation/chest pressure) at work, decided to come to ED for further evaluation.  Patient had intermittent palpitations (very brief episodes, self limiting) in the past, Holter monitoring 2 yrs ago, diagnosed with "skipped beat", was given option to take low dose medicine, but patient opted out.  Never had TTE, but had stress test 2 yrs ago, reported negative.  Denies exertional symptoms, syncope, focal weakness, or sick contact.  Asymptomatic since arrival to ED.    9/23: diagnostic cath via RFA; cleared for discharge today

## 2021-09-23 NOTE — H&P ADULT - HISTORY OF PRESENT ILLNESS
55 yo F w/ PMH hypothyroidism and HLD who presents with chest pain. Says overnight, she woke up with chest pain. Also felt back pain and arm pain along with palpitations. Was constant. Never had this before. In the morning, it improved but became intermitent. Not related to exertion. Current asymptomatic. Also initially had dizziness and blurriness in vision but now resolved. No cardiac hx or surgery. No family hx of cardiac disease. Denies smoking, drinking, drug use.     EKG shows sinus rhythm, no NATASHA or T wave inversions. 55 yo F w/ PMH hypothyroidism, HLD, gastritis, OA presents with chest pain, overnight woke up with chest pain. Also felt back pain and arm pain along with palpitations. Was constant. Never had this before. In the morning, it improved but became intermitent. Not related to exertion. Current asymptomatic. Also initially had dizziness and blurriness in vision but now resolved. No cardiac hx or surgery. No family hx of cardiac disease. Denies smoking, drinking, drug use.     EKG shows sinus rhythm, no NATASHA or T wave inversions. 55 yo F w/ PMH obesity, hypothyroidism, HLD, gastritis, OA presents with chest pain.  Patient was awaken the night prior by palpitations about midnight , followed by chest "tightness" across her upper chest, then radiated to L shoulder/arm/back and jaws, intermittently all night, associated with transient lightheadedness and "a second of blurry vision".  Symptoms improved the next morning, went to work but felt exhausted.  Patient had recurrent symptoms (but milder, and much brief episodes of palpitation/chest pressure) at work, decided to come to ED for further evaluation.  Patient had intermittent palpitations (very brief episodes, self limiting) in the past, Holter monitoring 2 yrs ago, diagnosed with "skipped beat", was given option to take low dose medicine, but patient opted out.  Never had TTE, but had stress test 2 yrs ago, reported negative.  Denies exertional symptoms, syncope, focal weakness, or sick contact.  Asymptomatic since arrival to ED.

## 2021-09-23 NOTE — DISCHARGE NOTE PROVIDER - NSRESEARCHGRANT_MLMHIDDEN_GEN_A_CORE
yes Mucosal Advancement Flap Text: Given the location of the defect, shape of the defect and the proximity to free margins a mucosal advancement flap was deemed most appropriate. Incisions were made with a 15 blade scalpel in the appropriate fashion along the cutaneous vermillion border and the mucosal lip. The remaining actinically damaged mucosal tissue was excised.  The mucosal advancement flap was then elevated to the gingival sulcus with care taken to preserve the neurovascular structures and advanced into the primary defect. Care was taken to ensure that precise realignment of the vermillion border was achieved.

## 2021-09-23 NOTE — DISCHARGE NOTE NURSING/CASE MANAGEMENT/SOCIAL WORK - PATIENT PORTAL LINK FT
You can access the FollowMyHealth Patient Portal offered by [facility] by registering at the following website: [email or web address]. By joining [employer]’s FollowMyHealth portal, you will also be able to view your health information using other applications (apps) compatible with our system.

## 2021-09-23 NOTE — H&P ADULT - ASSESSMENT
57 y/o F Hx hypothyroid, HLD p/w  57 y/o F Hx obesity (BMI36), hypothyroid, HLD, GERD, OA p/w palpitation and chest pressure a/w NSTEMI

## 2021-09-23 NOTE — DISCHARGE NOTE PROVIDER - NSDCMRMEDTOKEN_GEN_ALL_CORE_FT
levothyroxine 75 mcg (0.075 mg) oral tablet: 1 tab(s) orally once a day  Zocor 10 mg oral tablet: 0.5 tab(s) orally once a day (at bedtime)

## 2021-09-23 NOTE — ED ADULT NURSE REASSESSMENT NOTE - NS ED NURSE REASSESS COMMENT FT1
Pt resting comfortably in stretcher, on cardiac monitor NSR 70's. Heparin infusing, pt aware she is admitted and pending bed assignment.

## 2021-09-24 LAB
CHOLEST SERPL-MCNC: 161 MG/DL — SIGNIFICANT CHANGE UP
HDLC SERPL-MCNC: 50 MG/DL — LOW
LIPID PNL WITH DIRECT LDL SERPL: 95 MG/DL — SIGNIFICANT CHANGE UP
NON HDL CHOLESTEROL: 112 MG/DL — SIGNIFICANT CHANGE UP
TRIGL SERPL-MCNC: 82 MG/DL — SIGNIFICANT CHANGE UP

## 2021-09-27 ENCOUNTER — APPOINTMENT (OUTPATIENT)
Dept: INTERNAL MEDICINE | Facility: CLINIC | Age: 56
End: 2021-09-27
Payer: MEDICAID

## 2021-09-27 VITALS
BODY MASS INDEX: 37 KG/M2 | WEIGHT: 196 LBS | SYSTOLIC BLOOD PRESSURE: 121 MMHG | OXYGEN SATURATION: 96 % | DIASTOLIC BLOOD PRESSURE: 83 MMHG | TEMPERATURE: 98.6 F | HEIGHT: 61 IN | RESPIRATION RATE: 14 BRPM | HEART RATE: 74 BPM

## 2021-09-27 DIAGNOSIS — K76.9 LIVER DISEASE, UNSPECIFIED: ICD-10-CM

## 2021-09-27 PROCEDURE — 99213 OFFICE O/P EST LOW 20 MIN: CPT | Mod: 25

## 2021-09-29 NOTE — PHYSICAL EXAM
[No Acute Distress] : no acute distress [Well Nourished] : well nourished [Well Developed] : well developed [Well-Appearing] : well-appearing [Normal Sclera/Conjunctiva] : normal sclera/conjunctiva [PERRL] : pupils equal round and reactive to light [EOMI] : extraocular movements intact [Normal Outer Ear/Nose] : the outer ears and nose were normal in appearance [Normal Oropharynx] : the oropharynx was normal [No JVD] : no jugular venous distention [No Lymphadenopathy] : no lymphadenopathy [Supple] : supple [Thyroid Normal, No Nodules] : the thyroid was normal and there were no nodules present [No Respiratory Distress] : no respiratory distress  [No Accessory Muscle Use] : no accessory muscle use [Clear to Auscultation] : lungs were clear to auscultation bilaterally [Normal Rate] : normal rate  [Regular Rhythm] : with a regular rhythm [Normal S1, S2] : normal S1 and S2 [No Murmur] : no murmur heard [No Carotid Bruits] : no carotid bruits [No Abdominal Bruit] : a ~M bruit was not heard ~T in the abdomen [No Varicosities] : no varicosities [Pedal Pulses Present] : the pedal pulses are present [No Edema] : there was no peripheral edema [No Palpable Aorta] : no palpable aorta [No Extremity Clubbing/Cyanosis] : no extremity clubbing/cyanosis [Soft] : abdomen soft [Non Tender] : non-tender [Non-distended] : non-distended [No Masses] : no abdominal mass palpated [No HSM] : no HSM [Normal Bowel Sounds] : normal bowel sounds [Normal Posterior Cervical Nodes] : no posterior cervical lymphadenopathy [Normal Anterior Cervical Nodes] : no anterior cervical lymphadenopathy [No CVA Tenderness] : no CVA  tenderness [No Spinal Tenderness] : no spinal tenderness [No Joint Swelling] : no joint swelling [Grossly Normal Strength/Tone] : grossly normal strength/tone [No Rash] : no rash [Coordination Grossly Intact] : coordination grossly intact [No Focal Deficits] : no focal deficits [Normal Gait] : normal gait [Deep Tendon Reflexes (DTR)] : deep tendon reflexes were 2+ and symmetric [Normal Affect] : the affect was normal [Normal Insight/Judgement] : insight and judgment were intact [de-identified] : deferred [FreeTextEntry1] : deferred [de-identified] : small bruises left groin due to catherierization and both arms due to IV fluids

## 2021-09-29 NOTE — PLAN
[FreeTextEntry1] : Blood tests and urine sent to the lab\par \par Continue same medications\par \par Thyroid US

## 2021-09-29 NOTE — HEALTH RISK ASSESSMENT
[Never (0 pts)] : Never (0 points) [No] : In the past 12 months have you used drugs other than those required for medical reasons? No [No falls in past year] : Patient reported no falls in the past year [0] : 2) Feeling down, depressed, or hopeless: Not at all (0) [Good] : ~his/her~  mood as  good [] : No [Audit-CScore] : 0 [de-identified] : Sedentary [de-identified] : Regular [[id number]] : 0 [MammogramDate] : 10/2020 [PapSmearDate] : 10/2020 [BoneDensityDate] : 10/2020 [ColonoscopyDate] : 01/2018

## 2021-09-29 NOTE — HISTORY OF PRESENT ILLNESS
[FreeTextEntry1] : Follow-up YOHANNESD [de-identified] : CRYSTAL LI is a 56 year old female with a PMHx of HLD, hypothyroidism, liver disease, and OA who presents today for follow-up HLD. She states that she was hospitalized last Wednesday after she developed mid-sternum chest pain and palpitation. Pt had cardiac catheterization which was all okay as per pt.

## 2021-10-04 ENCOUNTER — TRANSCRIPTION ENCOUNTER (OUTPATIENT)
Age: 56
End: 2021-10-04

## 2021-10-05 LAB
25(OH)D3 SERPL-MCNC: 37.3 NG/ML
ALBUMIN SERPL ELPH-MCNC: 4.4 G/DL
ALP BLD-CCNC: 76 U/L
ALT SERPL-CCNC: 36 U/L
ANION GAP SERPL CALC-SCNC: 14 MMOL/L
AST SERPL-CCNC: 33 U/L
BASOPHILS # BLD AUTO: 0.03 K/UL
BASOPHILS NFR BLD AUTO: 0.3 %
BILIRUB SERPL-MCNC: 0.3 MG/DL
BUN SERPL-MCNC: 13 MG/DL
CALCIUM SERPL-MCNC: 9.6 MG/DL
CHLORIDE SERPL-SCNC: 102 MMOL/L
CHOLEST SERPL-MCNC: 162 MG/DL
CO2 SERPL-SCNC: 22 MMOL/L
CREAT SERPL-MCNC: 0.81 MG/DL
EOSINOPHIL # BLD AUTO: 0.17 K/UL
EOSINOPHIL NFR BLD AUTO: 2 %
GLUCOSE SERPL-MCNC: 63 MG/DL
HCT VFR BLD CALC: 46.9 %
HDLC SERPL-MCNC: 50 MG/DL
HGB BLD-MCNC: 14.5 G/DL
IMM GRANULOCYTES NFR BLD AUTO: 0.3 %
LDLC SERPL CALC-MCNC: 95 MG/DL
LYMPHOCYTES # BLD AUTO: 2.41 K/UL
LYMPHOCYTES NFR BLD AUTO: 27.9 %
MAN DIFF?: NORMAL
MCHC RBC-ENTMCNC: 30 PG
MCHC RBC-ENTMCNC: 30.9 GM/DL
MCV RBC AUTO: 97.1 FL
MONOCYTES # BLD AUTO: 0.79 K/UL
MONOCYTES NFR BLD AUTO: 9.1 %
NEUTROPHILS # BLD AUTO: 5.22 K/UL
NEUTROPHILS NFR BLD AUTO: 60.4 %
NONHDLC SERPL-MCNC: 112 MG/DL
PLATELET # BLD AUTO: 339 K/UL
POTASSIUM SERPL-SCNC: 5.5 MMOL/L
PROT SERPL-MCNC: 7.8 G/DL
RBC # BLD: 4.83 M/UL
RBC # FLD: 14 %
SODIUM SERPL-SCNC: 138 MMOL/L
T3 SERPL-MCNC: 100 NG/DL
T4 FREE SERPL-MCNC: 1.5 NG/DL
T4 SERPL-MCNC: 9.8 UG/DL
TRIGL SERPL-MCNC: 83 MG/DL
TSH SERPL-ACNC: 5.1 UIU/ML
VIT B12 SERPL-MCNC: 686 PG/ML
WBC # FLD AUTO: 8.65 K/UL

## 2021-10-19 PROBLEM — E03.9 HYPOTHYROIDISM, UNSPECIFIED: Chronic | Status: ACTIVE | Noted: 2021-09-23

## 2021-10-19 PROBLEM — E78.5 HYPERLIPIDEMIA, UNSPECIFIED: Chronic | Status: ACTIVE | Noted: 2021-09-23

## 2021-10-22 ENCOUNTER — APPOINTMENT (OUTPATIENT)
Dept: HEART AND VASCULAR | Facility: CLINIC | Age: 56
End: 2021-10-22
Payer: MEDICAID

## 2021-10-22 PROCEDURE — 90686 IIV4 VACC NO PRSV 0.5 ML IM: CPT

## 2021-10-22 PROCEDURE — 90471 IMMUNIZATION ADMIN: CPT

## 2021-12-17 ENCOUNTER — APPOINTMENT (OUTPATIENT)
Dept: INTERNAL MEDICINE | Facility: CLINIC | Age: 56
End: 2021-12-17

## 2022-01-14 ENCOUNTER — APPOINTMENT (OUTPATIENT)
Dept: INTERNAL MEDICINE | Facility: CLINIC | Age: 57
End: 2022-01-14
Payer: MEDICAID

## 2022-01-14 VITALS
DIASTOLIC BLOOD PRESSURE: 84 MMHG | SYSTOLIC BLOOD PRESSURE: 129 MMHG | OXYGEN SATURATION: 95 % | HEART RATE: 77 BPM | TEMPERATURE: 98.4 F | BODY MASS INDEX: 37.19 KG/M2 | WEIGHT: 197 LBS | HEIGHT: 61 IN

## 2022-01-14 DIAGNOSIS — R09.81 NASAL CONGESTION: ICD-10-CM

## 2022-01-14 PROCEDURE — 99213 OFFICE O/P EST LOW 20 MIN: CPT | Mod: 25

## 2022-01-15 NOTE — HEALTH RISK ASSESSMENT
[Never] : Never [Never (0 pts)] : Never (0 points) [No] : In the past 12 months have you used drugs other than those required for medical reasons? No [No falls in past year] : Patient reported no falls in the past year [0] : 2) Feeling down, depressed, or hopeless: Not at all (0) [Audit-CScore] : 0 [de-identified] : Sedentary [de-identified] : Regular [[id number]] : 0

## 2022-01-15 NOTE — END OF VISIT
[FreeTextEntry3] : I, Katiana Stephen, personally transcribed these services in the presence of Dr. Emma Winkler MD. I, Dr. Emma Winkler MD, personally performed the services described in this documentation as scribed by Katiana Stephen in my presence and it is both accurate and complete.

## 2022-01-21 LAB
25(OH)D3 SERPL-MCNC: 28.8 NG/ML
ALBUMIN SERPL ELPH-MCNC: 4.3 G/DL
ALP BLD-CCNC: 85 U/L
ALT SERPL-CCNC: 41 U/L
ANION GAP SERPL CALC-SCNC: 13 MMOL/L
AST SERPL-CCNC: 25 U/L
BILIRUB SERPL-MCNC: 0.2 MG/DL
BUN SERPL-MCNC: 20 MG/DL
CALCIUM SERPL-MCNC: 9.5 MG/DL
CHLORIDE SERPL-SCNC: 102 MMOL/L
CHOLEST SERPL-MCNC: 202 MG/DL
CO2 SERPL-SCNC: 24 MMOL/L
CREAT SERPL-MCNC: 0.83 MG/DL
GLUCOSE SERPL-MCNC: 78 MG/DL
HDLC SERPL-MCNC: 58 MG/DL
LDLC SERPL CALC-MCNC: 129 MG/DL
NONHDLC SERPL-MCNC: 143 MG/DL
POTASSIUM SERPL-SCNC: 4.9 MMOL/L
PROT SERPL-MCNC: 7.7 G/DL
SODIUM SERPL-SCNC: 139 MMOL/L
T3 SERPL-MCNC: 103 NG/DL
T4 FREE SERPL-MCNC: 1.4 NG/DL
T4 SERPL-MCNC: 9.7 UG/DL
TRIGL SERPL-MCNC: 72 MG/DL
TSH SERPL-ACNC: 3.11 UIU/ML

## 2022-01-24 LAB
BASOPHILS # BLD AUTO: 0.02 K/UL
BASOPHILS NFR BLD AUTO: 0.3 %
EOSINOPHIL # BLD AUTO: 0.14 K/UL
EOSINOPHIL NFR BLD AUTO: 1.9 %
HCT VFR BLD CALC: 45 %
HGB BLD-MCNC: 14.1 G/DL
IMM GRANULOCYTES NFR BLD AUTO: 0.3 %
LYMPHOCYTES # BLD AUTO: 2.21 K/UL
LYMPHOCYTES NFR BLD AUTO: 29.4 %
MAN DIFF?: NORMAL
MCHC RBC-ENTMCNC: 29.7 PG
MCHC RBC-ENTMCNC: 31.3 GM/DL
MCV RBC AUTO: 94.9 FL
MONOCYTES # BLD AUTO: 0.64 K/UL
MONOCYTES NFR BLD AUTO: 8.5 %
NEUTROPHILS # BLD AUTO: 4.49 K/UL
NEUTROPHILS NFR BLD AUTO: 59.6 %
PLATELET # BLD AUTO: 325 K/UL
RBC # BLD: 4.74 M/UL
RBC # FLD: 13.8 %
WBC # FLD AUTO: 7.52 K/UL

## 2022-02-14 ENCOUNTER — RX RENEWAL (OUTPATIENT)
Age: 57
End: 2022-02-14

## 2022-03-08 NOTE — H&P ADULT - PROBLEM SELECTOR PLAN 1
No - tele monitor, serial cardiac enzymes  - will treat with ASA, Brilinta load,, Hep gtt, Metoprolol and high intensity statin  - Cardio eval appreciated, plan for cardiac cath in am

## 2022-04-11 PROBLEM — Z11.59 SCREENING FOR VIRAL DISEASE: Status: ACTIVE | Noted: 2020-10-20

## 2022-07-14 ENCOUNTER — RX RENEWAL (OUTPATIENT)
Age: 57
End: 2022-07-14

## 2022-11-08 ENCOUNTER — APPOINTMENT (OUTPATIENT)
Dept: INTERNAL MEDICINE | Facility: CLINIC | Age: 57
End: 2022-11-08

## 2022-11-08 ENCOUNTER — NON-APPOINTMENT (OUTPATIENT)
Age: 57
End: 2022-11-08

## 2022-11-08 VITALS
DIASTOLIC BLOOD PRESSURE: 85 MMHG | RESPIRATION RATE: 16 BRPM | HEART RATE: 70 BPM | SYSTOLIC BLOOD PRESSURE: 120 MMHG | BODY MASS INDEX: 37.76 KG/M2 | TEMPERATURE: 98.6 F | WEIGHT: 200 LBS | HEIGHT: 61 IN | OXYGEN SATURATION: 98 %

## 2022-11-08 DIAGNOSIS — Z23 ENCOUNTER FOR IMMUNIZATION: ICD-10-CM

## 2022-11-08 DIAGNOSIS — M79.671 PAIN IN RIGHT FOOT: ICD-10-CM

## 2022-11-08 DIAGNOSIS — M79.672 PAIN IN RIGHT FOOT: ICD-10-CM

## 2022-11-08 DIAGNOSIS — K29.70 GASTRITIS, UNSPECIFIED, W/OUT BLEEDING: ICD-10-CM

## 2022-11-08 PROCEDURE — 93000 ELECTROCARDIOGRAM COMPLETE: CPT

## 2022-11-08 PROCEDURE — 90686 IIV4 VACC NO PRSV 0.5 ML IM: CPT

## 2022-11-08 PROCEDURE — 99396 PREV VISIT EST AGE 40-64: CPT | Mod: 25

## 2022-11-08 PROCEDURE — G0008: CPT

## 2022-11-08 PROCEDURE — 36415 COLL VENOUS BLD VENIPUNCTURE: CPT

## 2022-11-13 ENCOUNTER — NON-APPOINTMENT (OUTPATIENT)
Age: 57
End: 2022-11-13

## 2022-11-13 PROBLEM — M79.671 PAIN IN BOTH FEET: Status: ACTIVE | Noted: 2022-11-08

## 2022-11-13 NOTE — HEALTH RISK ASSESSMENT
[Good] : ~his/her~  mood as  good [Never] : Never [No] : In the past 12 months have you used drugs other than those required for medical reasons? No [No falls in past year] : Patient reported no falls in the past year [0] : 2) Feeling down, depressed, or hopeless: Not at all (0) [Audit-CScore] : 0 [de-identified] : Sedentary [de-identified] : Regular [[id number]] : 0 [PapSmearDate] : 01/2020 [BoneDensityDate] : 01/2020 [ColonoscopyDate] : 01/2019

## 2022-11-13 NOTE — HISTORY OF PRESENT ILLNESS
[FreeTextEntry1] : physical examination  [de-identified] : CRYSTAL LI is a 57 year old female with a PMHx of HLD, hypothyroidism, liver disease, and OA who presents today for physical examination. She is feeling okay however she relates pain of both feet, right more than left. \par \par Pt also relates epigastric discomfort and bloating for abdomen.

## 2022-11-13 NOTE — END OF VISIT
[FreeTextEntry3] : I, Yunior Hargrove, personally transcribed these services in the presence of Dr. Emma Winkler MD.\par I, Dr. Emma Winkler MD, personally performed the services described in this documentation as scribed by Yunior Hargrove in my presence and it is both accurate and complete.

## 2022-11-13 NOTE — PLAN
[FreeTextEntry1] : Blood tests and urine sent to the lab\par \par EKG\par \par H Influenza vaccine \par \par Mammography and breast US\par \par GI evaluation for EGD and colonoscopy

## 2022-11-13 NOTE — REVIEW OF SYSTEMS
[Negative] : Heme/Lymph [FreeTextEntry7] : epigastric discomfort ; bloating of abdomen  [FreeTextEntry9] : pain of both feet

## 2022-11-14 ENCOUNTER — APPOINTMENT (OUTPATIENT)
Dept: PODIATRY | Facility: CLINIC | Age: 57
End: 2022-11-14

## 2022-11-14 DIAGNOSIS — M20.12 HALLUX VALGUS (ACQUIRED), RIGHT FOOT: ICD-10-CM

## 2022-11-14 DIAGNOSIS — M20.11 HALLUX VALGUS (ACQUIRED), RIGHT FOOT: ICD-10-CM

## 2022-11-14 PROCEDURE — 99213 OFFICE O/P EST LOW 20 MIN: CPT | Mod: 25

## 2022-11-14 PROCEDURE — 20600 DRAIN/INJ JOINT/BURSA W/O US: CPT | Mod: RT

## 2022-11-14 PROCEDURE — 73630 X-RAY EXAM OF FOOT: CPT | Mod: RT

## 2022-11-15 DIAGNOSIS — M77.41 METATARSALGIA, RIGHT FOOT: ICD-10-CM

## 2022-11-15 DIAGNOSIS — M20.11 HALLUX VALGUS (ACQUIRED), RIGHT FOOT: ICD-10-CM

## 2022-11-15 DIAGNOSIS — M19.071 PRIMARY OSTEOARTHRITIS, RIGHT ANKLE AND FOOT: ICD-10-CM

## 2022-11-15 LAB
25(OH)D3 SERPL-MCNC: 33.2 NG/ML
ALBUMIN SERPL ELPH-MCNC: 4.4 G/DL
ALP BLD-CCNC: 85 U/L
ALT SERPL-CCNC: 34 U/L
ANION GAP SERPL CALC-SCNC: 12 MMOL/L
APPEARANCE: ABNORMAL
AST SERPL-CCNC: 22 U/L
BACTERIA: ABNORMAL
BASOPHILS # BLD AUTO: 0.03 K/UL
BASOPHILS NFR BLD AUTO: 0.4 %
BILIRUB SERPL-MCNC: 0.2 MG/DL
BILIRUBIN URINE: NEGATIVE
BLOOD URINE: NEGATIVE
BUN SERPL-MCNC: 14 MG/DL
CALCIUM OXALATE CRYSTALS: ABNORMAL
CALCIUM SERPL-MCNC: 9.1 MG/DL
CHLORIDE SERPL-SCNC: 103 MMOL/L
CHOLEST SERPL-MCNC: 175 MG/DL
CO2 SERPL-SCNC: 24 MMOL/L
COLOR: YELLOW
COVID-19 NUCLEOCAPSID  GAM ANTIBODY INTERPRETATION: POSITIVE
CREAT SERPL-MCNC: 0.75 MG/DL
EGFR: 92 ML/MIN/1.73M2
EOSINOPHIL # BLD AUTO: 0.15 K/UL
EOSINOPHIL NFR BLD AUTO: 1.8 %
ESTIMATED AVERAGE GLUCOSE: 126 MG/DL
GLUCOSE QUALITATIVE U: NEGATIVE
GLUCOSE SERPL-MCNC: 89 MG/DL
HBA1C MFR BLD HPLC: 6 %
HCT VFR BLD CALC: 44.1 %
HDLC SERPL-MCNC: 55 MG/DL
HGB BLD-MCNC: 13.8 G/DL
HYALINE CASTS: 0 /LPF
IMM GRANULOCYTES NFR BLD AUTO: 0.1 %
KETONES URINE: NEGATIVE
LDLC SERPL CALC-MCNC: 105 MG/DL
LEUKOCYTE ESTERASE URINE: ABNORMAL
LYMPHOCYTES # BLD AUTO: 2.6 K/UL
LYMPHOCYTES NFR BLD AUTO: 31.3 %
MAN DIFF?: NORMAL
MCHC RBC-ENTMCNC: 29.6 PG
MCHC RBC-ENTMCNC: 31.3 GM/DL
MCV RBC AUTO: 94.4 FL
MICROSCOPIC-UA: NORMAL
MONOCYTES # BLD AUTO: 0.94 K/UL
MONOCYTES NFR BLD AUTO: 11.3 %
NEUTROPHILS # BLD AUTO: 4.57 K/UL
NEUTROPHILS NFR BLD AUTO: 55.1 %
NITRITE URINE: NEGATIVE
NONHDLC SERPL-MCNC: 120 MG/DL
PH URINE: 6
PLATELET # BLD AUTO: 304 K/UL
POTASSIUM SERPL-SCNC: 4.5 MMOL/L
PROT SERPL-MCNC: 7.7 G/DL
PROTEIN URINE: NORMAL
RBC # BLD: 4.67 M/UL
RBC # FLD: 13.7 %
RED BLOOD CELLS URINE: 2 /HPF
SARS-COV-2 AB SERPL QL IA: 95.1 INDEX
SODIUM SERPL-SCNC: 139 MMOL/L
SPECIFIC GRAVITY URINE: 1.03
SQUAMOUS EPITHELIAL CELLS: 5 /HPF
T3 SERPL-MCNC: 99 NG/DL
T4 FREE SERPL-MCNC: 1.3 NG/DL
T4 SERPL-MCNC: 8.9 UG/DL
TRIGL SERPL-MCNC: 80 MG/DL
TSH SERPL-ACNC: 3.27 UIU/ML
UROBILINOGEN URINE: NORMAL
WBC # FLD AUTO: 8.3 K/UL
WHITE BLOOD CELLS URINE: 25 /HPF

## 2022-11-16 PROBLEM — M20.11 VALGUS DEFORMITY OF BOTH GREAT TOES: Status: ACTIVE | Noted: 2022-11-15

## 2022-11-16 NOTE — PROCEDURE
[FreeTextEntry1] : X-ray Report: (Bilateral foot - 3 views) Please note x-rays today of both feet demonstrates on the right side HAV with bunion with moderate bunion deformity. IM angle of 16 degrees, HA of 40 degrees, early signs of arthrosis, deviated sesamoid position of 5. Significant arthrosis and spurring at the 2nd met. cuneiform join with nonuniform joint narrowing. \par The left side demonstrates moderate but slight smaller bunion on the left side with an IM angle of 15 degrees, HA of 34 degrees and some arthrosis about the 2nd met. cuneiform joints.

## 2022-11-16 NOTE — PHYSICAL EXAM
[2+] : left foot dorsalis pedis 2+ [Sensation] : the sensory exam was normal to light touch and pinprick [No Focal Deficits] : no focal deficits [Deep Tendon Reflexes (DTR)] : deep tendon reflexes were 2+ and symmetric [Motor Exam] : the motor exam was normal [Delayed in the Right Toes] : capillary refills normal in right toes [Delayed in the Left Toes] : capillary refills normal in the left toes [FreeTextEntry3] : Hair growth noted on digits. Proximal to distal cooling is within normal limits.  [de-identified] : She has a lot of sensitivity at the 2nd met. cuneiform joint at the right side with a palpable arthritic exostosis at the right 2nd met. cuneiform joint. She also has a moderate HAV with bunion that is tracking, non-tract bound. There is no crepitus and there is bump pain. She has a very similar situation on the left side with moderate bunion and bump pain. The 2nd met. cuneiform joint sensitivity is much more mild. [FreeTextEntry1] : Grossly seen exostosis 2nd met. cuneiform join bilateral.

## 2022-11-16 NOTE — HISTORY OF PRESENT ILLNESS
[Sneakers] : madonna [FreeTextEntry1] : Patient presents today because of bilateral metatarsalgia with 2nd met. cuneiform joint pain, right side worse than left, and she has HAV with bunion right side worse than left. All problems are progressing and interfering with her lifestyle and ability to wear shoe gear. She has a hard time with anti-inflammatories and has tried topicals like Voltaren gel.\par \par

## 2022-11-16 NOTE — ASSESSMENT
[FreeTextEntry1] : \par Impression: Painful HAV with bunion, right side worse than left. 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 surgical alternatives. Today the patient consented. I prepped it with alcohol. I used Ethyl Chloride and injected the 2nd met. cuneiform joint under strict sterile technique with 1 1/2cc's of a combination of Marcaine and Kenalog-40. I put a strapping on. Regarding the bunion, I gave her stretching exercises and toe separators. Discussed Pérez-Melchor bunionectomy and exostectomy of the 2nd met. cuneiform joint. We discussed the postoperative course and potential complications which would be recurrence, continued pain syndrome or infection. With continued pain that persists we will scheduled out-patient surgery.

## 2022-12-08 NOTE — REASON FOR VISIT
[FreeTextEntry1] : No inpatient hospitalizations, trials of Prozac, Paxil (20 yrs), Xanax [Annual Wellness Visit] : an annual wellness visit

## 2023-01-22 ENCOUNTER — RX RENEWAL (OUTPATIENT)
Age: 58
End: 2023-01-22

## 2023-03-20 ENCOUNTER — APPOINTMENT (OUTPATIENT)
Dept: PODIATRY | Facility: CLINIC | Age: 58
End: 2023-03-20
Payer: MEDICAID

## 2023-03-20 DIAGNOSIS — M19.90 UNSPECIFIED OSTEOARTHRITIS, UNSPECIFIED SITE: ICD-10-CM

## 2023-03-20 DIAGNOSIS — M89.8X9 OTHER SPECIFIED DISORDERS OF BONE, UNSPECIFIED SITE: ICD-10-CM

## 2023-03-20 PROCEDURE — 99214 OFFICE O/P EST MOD 30 MIN: CPT

## 2023-03-21 PROBLEM — M19.90 OSTEOARTHRITIS: Status: ACTIVE | Noted: 2020-10-20

## 2023-03-24 PROBLEM — M89.8X9 EXOSTOSIS: Status: ACTIVE | Noted: 2023-03-21

## 2023-03-24 NOTE — HISTORY OF PRESENT ILLNESS
[Sneakers] : madonna [FreeTextEntry1] : Patient presents today for evaluation and care of painful HAV with bunion as well as 2nd met. cuneiform joint exostosis. Previous injection and anti-inflammatories, change in shoe gear and massage fails to help. She is asking about surgery both the bunion and instep pain continue to progress and interfere with her lifestyle and in fact is getting worse. The pain at the bump is 4/10. Pain at the instep is a 6/10. \par \par

## 2023-03-24 NOTE — ASSESSMENT
[FreeTextEntry1] : \par Impression: HAV with bunion, right foot. Osteoarthritis. Exostosis.\par \par Treatment: I discussed conservative vs. surgical alternatives. Injection gave her very temporary relief and she wants to proceed with surgery at this point. Continue to change shoes and ice. Hopefully this goes away as the weather gets better otherwise she wants to prepare for surgery which is the way she is leaning. I went through detailed consent for Pérez-Melchor bunionectomy as well as midfoot exostectomy. I discussed the possibility of fusion but it is painful specifically at the exostosis which is sharp and sensitive. She wants to try the exostectomy rather than the fusion understanding that is a possibility. I discussed the bunion procedure as well. I want through detailed consent for these procedures. \par I had a long discussion with the patient regarding procedure to be performed. This includes preoperative expectations, as well as operation to be performed, and postoperative expectations. We discussed risks, benefits and options with risks to include, but not limited to, delayed healing, postoperative infection, chronic swelling and numbness, under and over correction of the deformity, permanent numbness, painful scar, stiffness, chronic pain, as well as a need for further surgery, arthritis, AVN, DVT or amputation.  We discussed anesthesia to be utilized and expectations postoperatively of limited or non weight-bearing activities, use of crutches and/or walker, elevation and follow-up visits. Surgical consent was then signed and initialed by both the patient and myself. Patient is scheduled for Pérez-Akin bunionectomy and midfoot exostectomy at the 2nd and possibly 3rd met. cuneiform joint, right foot. Patient was allowed to ask all questions.  All questions were answered in detail. Patient has a clear understanding of the procedures to be performed.  Any questions or problems prior to that time, patient is to contact this office for further discussion.\par \par

## 2023-03-24 NOTE — PHYSICAL EXAM
[2+] : left foot dorsalis pedis 2+ [Sensation] : the sensory exam was normal to light touch and pinprick [No Focal Deficits] : no focal deficits [Deep Tendon Reflexes (DTR)] : deep tendon reflexes were 2+ and symmetric [Motor Exam] : the motor exam was normal [Delayed in the Right Toes] : capillary refills normal in right toes [Delayed in the Left Toes] : capillary refills normal in the left toes [FreeTextEntry3] : Hair growth noted on digits. Proximal to distal cooling is within normal limits.  [de-identified] : She has very sensitive 2nd met. cuneiform joint and exostosis that is easily reproducible with direct palpation at the exostosis. It is not painful when I stress the joint. She has moderate HAV with bunion with unchanged bump pain that is very annoying. It is tracking, non-tract bound. There is no crepitus. [FreeTextEntry1] : Swelling and inflammation at the 2nd and 3rd met. cuneiform join area at the instep of the foot, right. She has moderate HAV with bunion that is a chronic problem. It continues to be unchanged bursitis at the bump and bump pain. There is pinched callus as well.

## 2023-04-05 ENCOUNTER — APPOINTMENT (OUTPATIENT)
Dept: INTERNAL MEDICINE | Facility: CLINIC | Age: 58
End: 2023-04-05
Payer: MEDICAID

## 2023-04-05 ENCOUNTER — NON-APPOINTMENT (OUTPATIENT)
Age: 58
End: 2023-04-05

## 2023-04-05 VITALS
DIASTOLIC BLOOD PRESSURE: 79 MMHG | TEMPERATURE: 98.5 F | BODY MASS INDEX: 37.95 KG/M2 | HEIGHT: 61 IN | OXYGEN SATURATION: 98 % | SYSTOLIC BLOOD PRESSURE: 119 MMHG | WEIGHT: 201 LBS | HEART RATE: 95 BPM | RESPIRATION RATE: 14 BRPM

## 2023-04-05 DIAGNOSIS — Z01.818 ENCOUNTER FOR OTHER PREPROCEDURAL EXAMINATION: ICD-10-CM

## 2023-04-05 PROCEDURE — 36415 COLL VENOUS BLD VENIPUNCTURE: CPT

## 2023-04-05 PROCEDURE — 93000 ELECTROCARDIOGRAM COMPLETE: CPT

## 2023-04-05 PROCEDURE — 99214 OFFICE O/P EST MOD 30 MIN: CPT | Mod: 25

## 2023-04-07 ENCOUNTER — OUTPATIENT (OUTPATIENT)
Dept: OUTPATIENT SERVICES | Facility: HOSPITAL | Age: 58
LOS: 1 days | End: 2023-04-07
Payer: MEDICAID

## 2023-04-07 VITALS
HEART RATE: 90 BPM | RESPIRATION RATE: 14 BRPM | TEMPERATURE: 98 F | WEIGHT: 201.94 LBS | HEIGHT: 62 IN | SYSTOLIC BLOOD PRESSURE: 136 MMHG | OXYGEN SATURATION: 97 % | DIASTOLIC BLOOD PRESSURE: 89 MMHG

## 2023-04-07 DIAGNOSIS — M89.8X9 OTHER SPECIFIED DISORDERS OF BONE, UNSPECIFIED SITE: ICD-10-CM

## 2023-04-07 DIAGNOSIS — Z98.890 OTHER SPECIFIED POSTPROCEDURAL STATES: Chronic | ICD-10-CM

## 2023-04-07 DIAGNOSIS — M20.10 HALLUX VALGUS (ACQUIRED), UNSPECIFIED FOOT: ICD-10-CM

## 2023-04-07 DIAGNOSIS — Z01.818 ENCOUNTER FOR OTHER PREPROCEDURAL EXAMINATION: ICD-10-CM

## 2023-04-07 PROCEDURE — G0463: CPT

## 2023-04-07 RX ORDER — SODIUM CHLORIDE 9 MG/ML
1000 INJECTION, SOLUTION INTRAVENOUS
Refills: 0 | Status: DISCONTINUED | OUTPATIENT
Start: 2023-04-24 | End: 2023-05-08

## 2023-04-07 RX ORDER — SODIUM CHLORIDE 9 MG/ML
3 INJECTION INTRAMUSCULAR; INTRAVENOUS; SUBCUTANEOUS EVERY 8 HOURS
Refills: 0 | Status: DISCONTINUED | OUTPATIENT
Start: 2023-04-24 | End: 2023-05-08

## 2023-04-07 NOTE — H&P PST ADULT - HISTORY OF PRESENT ILLNESS
58 y/o female with PMH significant for hyperlipidemia, and hypothyroidism presents today for presurgical testing.  She is scheduled for a double osteotomy and exostosis on 4/24/23 with Reese Godwin.  She denies fever, chills, malaise, fatigue, n/v, diarrhea, cough, SOB, sore throat, palpitations, chest pain, ear pain, rhinnorhea, and nasal congestion.    PCP clearance completed- 4/5/23  Denies COVID infection in past 90 days 56 y/o female with PMH significant for hyperlipidemia, gerd, and hypothyroidism presents today for presurgical testing.  She is scheduled for a double osteotomy and exostosis-right foot on 4/24/23 with Reese Godwin.  She denies fever, chills, malaise, fatigue, n/v, diarrhea, cough, SOB, sore throat, palpitations, chest pain, ear pain, rhinnorhea, and nasal congestion.    PCP clearance completed- 4/5/23  Denies COVID infection in past 90 days 58 y/o female with PMH significant for hyperlipidemia, GERD, and hypothyroidism presents today for presurgical testing.  She is scheduled for a double osteotomy and exostosis-right foot on 4/24/23 with Reese Godwin.  She denies fever, chills, malaise, fatigue, n/v, diarrhea, cough, SOB, sore throat, palpitations, chest pain, ear pain, rhinnorhea, and nasal congestion.    PCP clearance completed- 4/5/23  Denies COVID infection in past 90 days

## 2023-04-07 NOTE — H&P PST ADULT - NSANTHOSAYNRD_GEN_A_CORE
No. YOLANDE screening performed.  STOP BANG Legend: 0-2 = LOW Risk; 3-4 = INTERMEDIATE Risk; 5-8 = HIGH Risk

## 2023-04-07 NOTE — H&P PST ADULT - NSICDXPASTMEDICALHX_GEN_ALL_CORE_FT
PAST MEDICAL HISTORY:  GERD (gastroesophageal reflux disease)     Hyperlipidemia     Hypothyroidism

## 2023-04-07 NOTE — H&P PST ADULT - ATTENDING COMMENTS
Consent for painful midfoot exostosis and gilbert / akin all right foot.Again reviewed surgical procedures to be performed. Discussed post op course and possible complications. Discussed continued pain syndrome, infection, DVT, AVN, over-, under- correction, recurrence, numbness, burning, delayed healing or wound issues with the patient. After all questions were answered, the patient signed the consent.

## 2023-04-07 NOTE — H&P PST ADULT - PROBLEM SELECTOR PLAN 1
-scheduled for a double osteotomy and exostosis-right foot on 4/24/23 with Reese Godwin  -No labs today in PST, completed 4/5/23 at PCP (in [facility])  -preop instructions and surgical scrub provided; patient stated understanding using teach back method; a written copy of instructions also provided  -Adequate time provided for questions and answers  -Patient advised to take levothyroxine am DOS with sip of water  -LR and antibiotics ordered per protocol DOS

## 2023-04-07 NOTE — H&P PST ADULT - PRIMARY CARE PROVIDER
Dr. Winkler, [facility] [phone] Dr. Winkler, [facility] [phone] (PCP clearance completed 4/5/23-labs in [facility])

## 2023-04-09 LAB
25(OH)D3 SERPL-MCNC: 27 NG/ML
ALBUMIN SERPL ELPH-MCNC: 4.3 G/DL
ALP BLD-CCNC: 89 U/L
ALT SERPL-CCNC: 49 U/L
ANION GAP SERPL CALC-SCNC: 14 MMOL/L
APPEARANCE: CLEAR
APTT BLD: 31.2 SEC
AST SERPL-CCNC: 26 U/L
BACTERIA: ABNORMAL /HPF
BASOPHILS # BLD AUTO: 0.02 K/UL
BASOPHILS NFR BLD AUTO: 0.2 %
BILIRUB SERPL-MCNC: 0.3 MG/DL
BILIRUBIN URINE: NEGATIVE
BLOOD URINE: NEGATIVE
BUN SERPL-MCNC: 26 MG/DL
CALCIUM SERPL-MCNC: 9.5 MG/DL
CAST: 0 /LPF
CHLORIDE SERPL-SCNC: 101 MMOL/L
CHOLEST SERPL-MCNC: 178 MG/DL
CO2 SERPL-SCNC: 22 MMOL/L
COLOR: YELLOW
CREAT SERPL-MCNC: 0.98 MG/DL
EGFR: 67 ML/MIN/1.73M2
EOSINOPHIL # BLD AUTO: 0.09 K/UL
EOSINOPHIL NFR BLD AUTO: 0.8 %
EPITHELIAL CELLS: 4 /HPF
ESTIMATED AVERAGE GLUCOSE: 120 MG/DL
GLUCOSE QUALITATIVE U: NEGATIVE MG/DL
GLUCOSE SERPL-MCNC: 95 MG/DL
HBA1C MFR BLD HPLC: 5.8 %
HCT VFR BLD CALC: 44.1 %
HDLC SERPL-MCNC: 54 MG/DL
HGB BLD-MCNC: 14 G/DL
IMM GRANULOCYTES NFR BLD AUTO: 0.4 %
KETONES URINE: NEGATIVE MG/DL
LDLC SERPL CALC-MCNC: 105 MG/DL
LEUKOCYTE ESTERASE URINE: ABNORMAL
LYMPHOCYTES # BLD AUTO: 2.41 K/UL
LYMPHOCYTES NFR BLD AUTO: 22.7 %
MAN DIFF?: NORMAL
MCHC RBC-ENTMCNC: 29.3 PG
MCHC RBC-ENTMCNC: 31.7 GM/DL
MCV RBC AUTO: 92.3 FL
MICROSCOPIC-UA: NORMAL
MONOCYTES # BLD AUTO: 0.9 K/UL
MONOCYTES NFR BLD AUTO: 8.5 %
NEUTROPHILS # BLD AUTO: 7.14 K/UL
NEUTROPHILS NFR BLD AUTO: 67.4 %
NITRITE URINE: NEGATIVE
NONHDLC SERPL-MCNC: 123 MG/DL
PH URINE: 6
PLATELET # BLD AUTO: 305 K/UL
POTASSIUM SERPL-SCNC: 4.8 MMOL/L
PROT SERPL-MCNC: 7.6 G/DL
PROTEIN URINE: NEGATIVE MG/DL
RBC # BLD: 4.78 M/UL
RBC # FLD: 13.4 %
RED BLOOD CELLS URINE: 0 /HPF
REVIEW: NORMAL
SODIUM SERPL-SCNC: 138 MMOL/L
SPECIFIC GRAVITY URINE: 1.02
T3 SERPL-MCNC: 105 NG/DL
T4 FREE SERPL-MCNC: 1.3 NG/DL
T4 SERPL-MCNC: 9.7 UG/DL
TRIGL SERPL-MCNC: 92 MG/DL
TSH SERPL-ACNC: 2.8 UIU/ML
UROBILINOGEN URINE: 0.2 MG/DL
WBC # FLD AUTO: 10.6 K/UL
WHITE BLOOD CELLS URINE: 5 /HPF

## 2023-04-19 LAB
INR PPP: 1.06 RATIO
PT BLD: 12.4 SEC

## 2023-04-20 NOTE — ASSESSMENT
[Patient Optimized for Surgery] : Patient optimized for surgery [No Further Testing Recommended] : no further testing recommended [As per surgery] : as per surgery [FreeTextEntry4] : PATIENT STABLE. NO ABSOLUTE MEDICAL CONTRAINDICATIONS FOR ABOVE PROCEDURE

## 2023-04-20 NOTE — END OF VISIT
[FreeTextEntry3] :  I, Wilfrid Milan, personally transcribed these services in the presence of Dr. Emma Winkler MD.\par I, Dr. Emma Winkler MD, personally performed the services described in this documentation as scribed by Wilfrid Milan in my presence and it is both accurate and complete.

## 2023-04-20 NOTE — HISTORY OF PRESENT ILLNESS
[No Pertinent Cardiac History] : no history of aortic stenosis, atrial fibrillation, coronary artery disease, recent myocardial infarction, or implantable device/pacemaker [Aortic Stenosis] : no aortic stenosis [Atrial Fibrillation] : no atrial fibrillation [Coronary Artery Disease] : no coronary artery disease [Recent Myocardial Infarction] : no recent myocardial infarction [Implantable Device/Pacemaker] : no implantable device/pacemaker [No Pertinent Pulmonary History] : no history of asthma, COPD, sleep apnea, or smoking [Asthma] : no asthma [COPD] : no COPD [Sleep Apnea] : no sleep apnea [Smoker] : not a smoker [No Adverse Anesthesia Reaction] : no adverse anesthesia reaction in self or family member [Family Member] : no family member with adverse anesthesia reaction/sudden death [Self] : no previous adverse anesthesia reaction [Chronic Anticoagulation] : no chronic anticoagulation [Chronic Kidney Disease] : no chronic kidney disease [Diabetes] : no diabetes [(Patient denies any chest pain, claudication, dyspnea on exertion, orthopnea, palpitations or syncope)] : Patient denies any chest pain, claudication, dyspnea on exertion, orthopnea, palpitations or syncope [Good (7-10 METs)] : Good (7-10 METs) [FreeTextEntry1] : Double Osteotomy Right foot [FreeTextEntry2] : 4/24/23 [FreeTextEntry3] : Dr. Cramer [FreeTextEntry4] : CRYSTAL LI is a 57 year old female with a PMHx of HLD, hypothyroidism, liver disease, and OA who presents today for Preoperative evaluation.

## 2023-04-20 NOTE — PHYSICAL EXAM
[No Acute Distress] : no acute distress [Well Nourished] : well nourished [Well Developed] : well developed [Well-Appearing] : well-appearing [Normal Sclera/Conjunctiva] : normal sclera/conjunctiva [PERRL] : pupils equal round and reactive to light [EOMI] : extraocular movements intact [Normal Outer Ear/Nose] : the outer ears and nose were normal in appearance [Normal Oropharynx] : the oropharynx was normal [No JVD] : no jugular venous distention [No Lymphadenopathy] : no lymphadenopathy [Supple] : supple [Thyroid Normal, No Nodules] : the thyroid was normal and there were no nodules present [No Respiratory Distress] : no respiratory distress  [No Accessory Muscle Use] : no accessory muscle use [Clear to Auscultation] : lungs were clear to auscultation bilaterally [Normal Rate] : normal rate  [Regular Rhythm] : with a regular rhythm [Normal S1, S2] : normal S1 and S2 [No Murmur] : no murmur heard [No Carotid Bruits] : no carotid bruits [No Abdominal Bruit] : a ~M bruit was not heard ~T in the abdomen [No Varicosities] : no varicosities [Pedal Pulses Present] : the pedal pulses are present [No Edema] : there was no peripheral edema [No Palpable Aorta] : no palpable aorta [No Extremity Clubbing/Cyanosis] : no extremity clubbing/cyanosis [Soft] : abdomen soft [Non Tender] : non-tender [Non-distended] : non-distended [No Masses] : no abdominal mass palpated [No HSM] : no HSM [Normal Bowel Sounds] : normal bowel sounds [Normal Posterior Cervical Nodes] : no posterior cervical lymphadenopathy [Normal Anterior Cervical Nodes] : no anterior cervical lymphadenopathy [No CVA Tenderness] : no CVA  tenderness [No Spinal Tenderness] : no spinal tenderness [No Joint Swelling] : no joint swelling [Grossly Normal Strength/Tone] : grossly normal strength/tone [No Rash] : no rash [Coordination Grossly Intact] : coordination grossly intact [No Focal Deficits] : no focal deficits [Normal Gait] : normal gait [Deep Tendon Reflexes (DTR)] : deep tendon reflexes were 2+ and symmetric [Normal Affect] : the affect was normal [Normal Insight/Judgement] : insight and judgment were intact [de-identified] : overweight [de-identified] : deferred [FreeTextEntry1] : deferred

## 2023-04-23 ENCOUNTER — TRANSCRIPTION ENCOUNTER (OUTPATIENT)
Age: 58
End: 2023-04-23

## 2023-04-24 ENCOUNTER — APPOINTMENT (OUTPATIENT)
Dept: PODIATRY | Facility: HOSPITAL | Age: 58
End: 2023-04-24
Payer: MEDICAID

## 2023-04-24 ENCOUNTER — RESULT REVIEW (OUTPATIENT)
Age: 58
End: 2023-04-24

## 2023-04-24 ENCOUNTER — TRANSCRIPTION ENCOUNTER (OUTPATIENT)
Age: 58
End: 2023-04-24

## 2023-04-24 ENCOUNTER — OUTPATIENT (OUTPATIENT)
Dept: OUTPATIENT SERVICES | Facility: HOSPITAL | Age: 58
LOS: 1 days | End: 2023-04-24
Payer: MEDICAID

## 2023-04-24 VITALS
RESPIRATION RATE: 16 BRPM | HEART RATE: 60 BPM | OXYGEN SATURATION: 98 % | SYSTOLIC BLOOD PRESSURE: 118 MMHG | DIASTOLIC BLOOD PRESSURE: 58 MMHG

## 2023-04-24 VITALS
HEART RATE: 67 BPM | OXYGEN SATURATION: 96 % | DIASTOLIC BLOOD PRESSURE: 89 MMHG | WEIGHT: 201.94 LBS | SYSTOLIC BLOOD PRESSURE: 141 MMHG | HEIGHT: 62 IN | TEMPERATURE: 97 F | RESPIRATION RATE: 16 BRPM

## 2023-04-24 DIAGNOSIS — M20.10 HALLUX VALGUS (ACQUIRED), UNSPECIFIED FOOT: ICD-10-CM

## 2023-04-24 DIAGNOSIS — M89.8X9 OTHER SPECIFIED DISORDERS OF BONE, UNSPECIFIED SITE: ICD-10-CM

## 2023-04-24 DIAGNOSIS — Z98.890 OTHER SPECIFIED POSTPROCEDURAL STATES: Chronic | ICD-10-CM

## 2023-04-24 PROCEDURE — 73630 X-RAY EXAM OF FOOT: CPT

## 2023-04-24 PROCEDURE — 73630 X-RAY EXAM OF FOOT: CPT | Mod: 26,RT

## 2023-04-24 PROCEDURE — 28122 PARTIAL REMOVAL OF FOOT BONE: CPT | Mod: RT,59

## 2023-04-24 PROCEDURE — 88311 DECALCIFY TISSUE: CPT | Mod: 26

## 2023-04-24 PROCEDURE — 88311 DECALCIFY TISSUE: CPT

## 2023-04-24 PROCEDURE — 88304 TISSUE EXAM BY PATHOLOGIST: CPT

## 2023-04-24 PROCEDURE — 28299 COR HLX VLGS DOUBLE OSTEOT: CPT | Mod: RT

## 2023-04-24 PROCEDURE — C1713: CPT

## 2023-04-24 PROCEDURE — 88305 TISSUE EXAM BY PATHOLOGIST: CPT | Mod: 26

## 2023-04-24 PROCEDURE — 88304 TISSUE EXAM BY PATHOLOGIST: CPT | Mod: 26

## 2023-04-24 PROCEDURE — 88305 TISSUE EXAM BY PATHOLOGIST: CPT

## 2023-04-24 PROCEDURE — C1889: CPT

## 2023-04-24 DEVICE — PIN POSITIONING 2MM: Type: IMPLANTABLE DEVICE | Site: RIGHT | Status: FUNCTIONAL

## 2023-04-24 DEVICE — KWIRE 1.1MMX15.2CM: Type: IMPLANTABLE DEVICE | Site: RIGHT | Status: FUNCTIONAL

## 2023-04-24 DEVICE — CLIP EASY MID FOOT 15X12X12: Type: IMPLANTABLE DEVICE | Site: RIGHT | Status: FUNCTIONAL

## 2023-04-24 DEVICE — IMPLANTABLE DEVICE: Type: IMPLANTABLE DEVICE | Site: RIGHT | Status: FUNCTIONAL

## 2023-04-24 RX ORDER — FENTANYL CITRATE 50 UG/ML
25 INJECTION INTRAVENOUS
Refills: 0 | Status: DISCONTINUED | OUTPATIENT
Start: 2023-04-24 | End: 2023-04-24

## 2023-04-24 RX ORDER — CEFAZOLIN SODIUM 1 G
2000 VIAL (EA) INJECTION ONCE
Refills: 0 | Status: COMPLETED | OUTPATIENT
Start: 2023-04-24 | End: 2023-04-24

## 2023-04-24 RX ORDER — LIDOCAINE HCL 20 MG/ML
0.2 VIAL (ML) INJECTION ONCE
Refills: 0 | Status: COMPLETED | OUTPATIENT
Start: 2023-04-24 | End: 2023-04-24

## 2023-04-24 RX ORDER — CHLORHEXIDINE GLUCONATE 213 G/1000ML
1 SOLUTION TOPICAL ONCE
Refills: 0 | Status: COMPLETED | OUTPATIENT
Start: 2023-04-24 | End: 2023-04-24

## 2023-04-24 RX ORDER — LEVOTHYROXINE SODIUM 125 MCG
1 TABLET ORAL
Qty: 0 | Refills: 0 | DISCHARGE

## 2023-04-24 RX ORDER — ONDANSETRON 8 MG/1
4 TABLET, FILM COATED ORAL ONCE
Refills: 0 | Status: DISCONTINUED | OUTPATIENT
Start: 2023-04-24 | End: 2023-05-08

## 2023-04-24 RX ADMIN — SODIUM CHLORIDE 3 MILLILITER(S): 9 INJECTION INTRAMUSCULAR; INTRAVENOUS; SUBCUTANEOUS at 09:44

## 2023-04-24 RX ADMIN — CHLORHEXIDINE GLUCONATE 1 APPLICATION(S): 213 SOLUTION TOPICAL at 09:54

## 2023-04-24 RX ADMIN — SODIUM CHLORIDE 100 MILLILITER(S): 9 INJECTION, SOLUTION INTRAVENOUS at 09:54

## 2023-04-24 NOTE — ASU DISCHARGE PLAN (ADULT/PEDIATRIC) - NS MD DC FALL RISK RISK
For information on Fall & Injury Prevention, visit: [email or web address] OR  [email or web address] OR  https://www.cdc.gov/steadi/patient.html

## 2023-04-24 NOTE — ASU DISCHARGE PLAN (ADULT/PEDIATRIC) - ASU DC SPECIAL INSTRUCTIONSFT
You just had R foot surgery. Keep your dressings clean dry and intact until follow up with Dr. Cramer. Do NOT get your dressings wet. You may bear weight to your R foot heel in a surgical shoe.

## 2023-04-24 NOTE — ASU DISCHARGE PLAN (ADULT/PEDIATRIC) - NURSING INSTRUCTIONS
Next dose of Tylenol will be on or after ___5:45pm________ ,today/tonight and every 6 hours afterwards for pain management, do not take any Tylenol containing products until this time. Your first dose of Tylenol was given at __11:45am_________. Do not exceed more than 4000mg of Tylenol in one 24 hour setting. If no contraindications, you may alternate with Ibuprofen 3 hours after dose of Tylenol. Ibuprofen can be taken every 6 hours. Next dose of ibuprofen can be taken at/after-- 6pm-- if needed for pain. First dose of toradol was given at 12noon in the OR.

## 2023-04-25 PROBLEM — K21.9 GASTRO-ESOPHAGEAL REFLUX DISEASE WITHOUT ESOPHAGITIS: Chronic | Status: ACTIVE | Noted: 2023-04-07

## 2023-04-27 ENCOUNTER — APPOINTMENT (OUTPATIENT)
Dept: PODIATRY | Facility: CLINIC | Age: 58
End: 2023-04-27
Payer: MEDICAID

## 2023-04-27 PROCEDURE — 99024 POSTOP FOLLOW-UP VISIT: CPT

## 2023-05-02 LAB — SURGICAL PATHOLOGY STUDY: SIGNIFICANT CHANGE UP

## 2023-05-04 ENCOUNTER — APPOINTMENT (OUTPATIENT)
Dept: PODIATRY | Facility: CLINIC | Age: 58
End: 2023-05-04
Payer: MEDICAID

## 2023-05-04 PROCEDURE — 99024 POSTOP FOLLOW-UP VISIT: CPT

## 2023-05-05 ENCOUNTER — RX RENEWAL (OUTPATIENT)
Age: 58
End: 2023-05-05

## 2023-05-09 NOTE — ASSESSMENT
[FreeTextEntry1] : \par Impression: Postop. bunion and midfoot surgery, right foot. \par \par Treatment:  I took sutures out. I put an Adaptic sterile dressing on with gauze and Ace. She will continue fracture shoe. Follow-up next time for x-rays and return to sneaker.

## 2023-05-09 NOTE — ASSESSMENT
[FreeTextEntry1] : \par Impression: Postop. right foot.\par \par Treatment: I put an Adaptic sterile dressing on. She will continue fracture shoe, assistive device and mild activity. I will see her next week for suture removal.

## 2023-05-09 NOTE — PHYSICAL EXAM
[2+] : left foot dorsalis pedis 2+ [Sensation] : the sensory exam was normal to light touch and pinprick [No Focal Deficits] : no focal deficits [Deep Tendon Reflexes (DTR)] : deep tendon reflexes were 2+ and symmetric [Motor Exam] : the motor exam was normal [Delayed in the Right Toes] : capillary refills normal in right toes [Delayed in the Left Toes] : capillary refills normal in the left toes [FreeTextEntry3] : Hair growth noted on digits. Proximal to distal cooling is within normal limits.  [de-identified] : Good repositioning of the bunion and healing at both areas. [FreeTextEntry1] : Clinically the incisions are well coapted. the swelling is mild. There is no erythema, drainage or infection.

## 2023-05-09 NOTE — HISTORY OF PRESENT ILLNESS
[Sneakers] : madonna [FreeTextEntry1] : Patient presents today for evaluation 1 1/2 weeks S/P bunion and midfoot surgery, right foot. She is really progressing nicely. She is not taking pain meds. She is healing in well.  WDL

## 2023-05-09 NOTE — HISTORY OF PRESENT ILLNESS
[Sneakers] : madonna [FreeTextEntry1] : Patient presents today for evaluation S/P multiple procedures, bunion and midfoot arthritis, right foot. She is doing great. She has no complaints and no pain. She is not taking pain meds.\par \par

## 2023-05-09 NOTE — PHYSICAL EXAM
[2+] : left foot dorsalis pedis 2+ [Sensation] : the sensory exam was normal to light touch and pinprick [No Focal Deficits] : no focal deficits [Deep Tendon Reflexes (DTR)] : deep tendon reflexes were 2+ and symmetric [Motor Exam] : the motor exam was normal [Delayed in the Right Toes] : capillary refills normal in right toes [Delayed in the Left Toes] : capillary refills normal in the left toes [FreeTextEntry3] : Hair growth noted on digits. Proximal to distal cooling is within normal limits.  [de-identified] :  No real pain. She has nice correction.  [FreeTextEntry1] : Clinically the incisions are well coapted. There is minimal swelling and certainly no erythema or drainage.

## 2023-05-15 ENCOUNTER — APPOINTMENT (OUTPATIENT)
Dept: PODIATRY | Facility: CLINIC | Age: 58
End: 2023-05-15
Payer: MEDICAID

## 2023-05-15 PROCEDURE — 73630 X-RAY EXAM OF FOOT: CPT | Mod: RT

## 2023-05-26 NOTE — PROCEDURE
[FreeTextEntry1] : X-rays were taken of the right foot to assess healing.\par (3 views) The osteotomy is still visible through the 1st metatarsal head and proximal phalanx of the hallux with staple fixation intact.  No acute fracture/dislocations or erosions noted.  \par Improved intermetatarsal angle compared to preoperative.

## 2023-05-26 NOTE — PHYSICAL EXAM
[2+] : left foot dorsalis pedis 2+ [Sensation] : the sensory exam was normal to light touch and pinprick [No Focal Deficits] : no focal deficits [Deep Tendon Reflexes (DTR)] : deep tendon reflexes were 2+ and symmetric [Motor Exam] : the motor exam was normal [Delayed in the Right Toes] : capillary refills normal in right toes [Delayed in the Left Toes] : capillary refills normal in the left toes [FreeTextEntry3] : Temperature gradient: within normal limits [de-identified] : S/P right bunionectomy with ROM of the 1st MPJ intact.   [FreeTextEntry1] : Pedal hair: present.  \par Right foot incision sites over the dorsal midfoot and the 1st ray, well coapted with Steri-Strips present.  One suture is still present in the right midfoot incision, which was removed today.  No dehiscence or clinical signs of infection.  Moderate amount of edema present.

## 2023-05-26 NOTE — ASSESSMENT
[FreeTextEntry1] : Impression: Postoperative bunion and midfoot surgery, right (Z48.89).\par \par Treatment: Incision sites were wiped with alcohol and the one residual suture was removed.  Band-Aid was applied.  The foot was wrapped with an Ace wrap.  Advised patient to wash the foot with soap and water; the Steri-Strips will fall off on their own.  Continue wrapping the foot with an Ace wrap for edema control.  Continue to ambulate in the surgical shoe.  Elevate when not ambulating to decrease the edema.  \par She can do off-weight bearing ROM exercises for the 1st MPJ. \par Return: 2 weeks with Dr. Cramer for repeat imaging and reevaluation.  \par \par

## 2023-05-26 NOTE — HISTORY OF PRESENT ILLNESS
[Sneakers] : madonna [FreeTextEntry1] : Patient presents today for postoperative visit after right foot distal metatarsal head bunionectomy and Melchor osteotomy, midfoot exostectomy done by Dr. Cramer on 04/24/23.  Patient states that she has been ambulating in the surgical shoe.  She has kept the dressing clean, dry and intact.  Reports no fever, chills, drainage or calf pain.

## 2023-05-30 ENCOUNTER — APPOINTMENT (OUTPATIENT)
Dept: PODIATRY | Facility: CLINIC | Age: 58
End: 2023-05-30
Payer: MEDICAID

## 2023-05-30 DIAGNOSIS — M19.071 PRIMARY OSTEOARTHRITIS, RIGHT ANKLE AND FOOT: ICD-10-CM

## 2023-05-30 DIAGNOSIS — M19.072 PRIMARY OSTEOARTHRITIS, RIGHT ANKLE AND FOOT: ICD-10-CM

## 2023-05-30 PROCEDURE — 73630 X-RAY EXAM OF FOOT: CPT | Mod: RT

## 2023-05-31 PROBLEM — M19.071 PRIMARY OSTEOARTHRITIS OF BOTH FEET: Status: ACTIVE | Noted: 2022-11-15

## 2023-06-01 NOTE — PHYSICAL EXAM
[2+] : left foot dorsalis pedis 2+ [Sensation] : the sensory exam was normal to light touch and pinprick [No Focal Deficits] : no focal deficits [Deep Tendon Reflexes (DTR)] : deep tendon reflexes were 2+ and symmetric [Motor Exam] : the motor exam was normal [Delayed in the Right Toes] : capillary refills normal in right toes [Delayed in the Left Toes] : capillary refills normal in the left toes [FreeTextEntry3] : Hair growth noted on digits. Proximal to distal cooling is within normal limits.  [FreeTextEntry1] : The incisions are very well coapted. The swelling is essentially resolved. There is no pain at the osteotomies of the great toe bunion or where the exostosis and arthritis was treated at the midfoot. She is doing really well and her ROM is clean and essentially normal.

## 2023-06-01 NOTE — PROCEDURE
[FreeTextEntry1] : X-rays were taken to evaluate healing.\par X-rays, 3 views right foot, were taken that demonstrate complete signs of osteogenesis and healing at the great toe and bunion with fixation in good position without movement. Clean resection at the met. cuneiform, bossing with no sign of regeneration or spurring appreciated.

## 2023-06-01 NOTE — ASSESSMENT
[FreeTextEntry1] : \par Impression: Postoperative bunion and midfoot surgery, right \par \par Treatment: She certainly doesn't need physical therapy. She is progressing nicely. I want her to continue to massage the foot and wear a comfortable sneaker for the next month and then she could gradually increase activity thereafter. I gave her ROM exercises even though it is back to normal. I discussed sneakers for her to purchase for the future with metatarsal support. She will follow-up in the office for evaluation as needed.

## 2023-06-01 NOTE — HISTORY OF PRESENT ILLNESS
[FreeTextEntry1] : Patient presents today 2 months S/P right bunion surgery as well as midfoot exostosis. She is doing quite well. She is really progressing nicely. She notices slight sensitivity sometimes when walking barefoot but overall everything is trending better.

## 2023-07-10 ENCOUNTER — APPOINTMENT (OUTPATIENT)
Dept: PODIATRY | Facility: CLINIC | Age: 58
End: 2023-07-10
Payer: MEDICAID

## 2023-07-10 DIAGNOSIS — M20.10 HALLUX VALGUS (ACQUIRED), UNSPECIFIED FOOT: ICD-10-CM

## 2023-07-10 DIAGNOSIS — Z48.89 ENCOUNTER FOR OTHER SPECIFIED SURGICAL AFTERCARE: ICD-10-CM

## 2023-07-10 PROCEDURE — 99024 POSTOP FOLLOW-UP VISIT: CPT

## 2023-07-11 PROBLEM — Z48.89 ENCOUNTER FOR SURGICAL FOLLOW-UP CARE: Status: ACTIVE | Noted: 2023-05-03

## 2023-07-11 PROBLEM — M20.10 HALLUX VALGUS: Status: ACTIVE | Noted: 2023-03-21

## 2023-07-13 NOTE — PHYSICAL EXAM
[2+] : left foot dorsalis pedis 2+ [Sensation] : the sensory exam was normal to light touch and pinprick [No Focal Deficits] : no focal deficits [Deep Tendon Reflexes (DTR)] : deep tendon reflexes were 2+ and symmetric [Motor Exam] : the motor exam was normal [Delayed in the Right Toes] : capillary refills normal in right toes [Delayed in the Left Toes] : capillary refills normal in the left toes [FreeTextEntry3] : Hair growth noted on digits. Proximal to distal cooling is within normal limits.  [de-identified] : There is no calf pain or sensitivity whatsoever. There is good correction of the bunion. No pain at the 1st met. cuneiform joint. Increased 1st MPJ ROM. [FreeTextEntry1] : Incisions are well coapted.

## 2023-07-13 NOTE — HISTORY OF PRESENT ILLNESS
[Sneakers] : madonna [FreeTextEntry1] : Patient presents today S/P right bunionectomy and removal of exostosis.  She is doing quite well. She has no pain. She has some swelling at the big toe on occasion but trending better. No leg issues whatsoever.

## 2023-07-13 NOTE — ASSESSMENT
[FreeTextEntry1] : \par Impression: Postoperative bunion and midfoot surgery, right \par \par Treatment: I gave her exercises to work on. She is wearing support stockings which I think she should continue to use and may gradually increase activity. She was asking about heels but I don't want her to try that for at least a month. She will continue gradual increase in activity. She is discharged from treatment.

## 2023-10-10 ENCOUNTER — RX RENEWAL (OUTPATIENT)
Age: 58
End: 2023-10-10

## 2023-11-06 ENCOUNTER — APPOINTMENT (OUTPATIENT)
Dept: INTERNAL MEDICINE | Facility: CLINIC | Age: 58
End: 2023-11-06
Payer: MEDICAID

## 2023-11-06 VITALS
RESPIRATION RATE: 12 BRPM | SYSTOLIC BLOOD PRESSURE: 130 MMHG | BODY MASS INDEX: 38.33 KG/M2 | WEIGHT: 203 LBS | TEMPERATURE: 98 F | DIASTOLIC BLOOD PRESSURE: 83 MMHG | HEART RATE: 89 BPM | OXYGEN SATURATION: 97 % | HEIGHT: 61 IN

## 2023-11-06 PROCEDURE — 99204 OFFICE O/P NEW MOD 45 MIN: CPT

## 2023-11-06 RX ORDER — OMEPRAZOLE 40 MG/1
40 CAPSULE, DELAYED RELEASE ORAL
Qty: 10 | Refills: 3 | Status: DISCONTINUED | COMMUNITY
Start: 2020-01-03 | End: 2023-11-06

## 2023-11-06 RX ORDER — OXYCODONE AND ACETAMINOPHEN 5; 325 MG/1; MG/1
5-325 TABLET ORAL
Qty: 15 | Refills: 0 | Status: DISCONTINUED | COMMUNITY
Start: 2023-04-24 | End: 2023-11-06

## 2023-11-06 RX ORDER — FLUTICASONE PROPIONATE 50 UG/1
50 SPRAY, METERED NASAL DAILY
Qty: 1 | Refills: 1 | Status: DISCONTINUED | COMMUNITY
Start: 2022-01-14 | End: 2023-11-06

## 2023-11-06 RX ORDER — MELOXICAM 15 MG/1
15 TABLET ORAL
Qty: 30 | Refills: 3 | Status: DISCONTINUED | COMMUNITY
Start: 2020-10-20 | End: 2023-11-06

## 2023-11-06 RX ORDER — OMEPRAZOLE 20 MG/1
20 TABLET, DELAYED RELEASE ORAL
Refills: 0 | Status: ACTIVE | COMMUNITY

## 2023-12-18 ENCOUNTER — APPOINTMENT (OUTPATIENT)
Dept: INTERNAL MEDICINE | Facility: CLINIC | Age: 58
End: 2023-12-18

## 2023-12-18 ENCOUNTER — APPOINTMENT (OUTPATIENT)
Dept: PULMONOLOGY | Facility: CLINIC | Age: 58
End: 2023-12-18
Payer: MEDICAID

## 2023-12-18 VITALS
DIASTOLIC BLOOD PRESSURE: 77 MMHG | TEMPERATURE: 97.2 F | HEART RATE: 78 BPM | SYSTOLIC BLOOD PRESSURE: 112 MMHG | RESPIRATION RATE: 14 BRPM | OXYGEN SATURATION: 95 % | WEIGHT: 203 LBS | BODY MASS INDEX: 38.36 KG/M2

## 2023-12-18 DIAGNOSIS — Z80.9 FAMILY HISTORY OF MALIGNANT NEOPLASM, UNSPECIFIED: ICD-10-CM

## 2023-12-18 DIAGNOSIS — J44.89 OTHER SPECIFIED CHRONIC OBSTRUCTIVE PULMONARY DISEASE: ICD-10-CM

## 2023-12-18 DIAGNOSIS — R06.02 SHORTNESS OF BREATH: ICD-10-CM

## 2023-12-18 DIAGNOSIS — R07.89 OTHER CHEST PAIN: ICD-10-CM

## 2023-12-18 DIAGNOSIS — E03.9 HYPOTHYROIDISM, UNSPECIFIED: ICD-10-CM

## 2023-12-18 PROCEDURE — 99203 OFFICE O/P NEW LOW 30 MIN: CPT

## 2023-12-18 NOTE — ASSESSMENT
[FreeTextEntry1] : In summary the patient is a 58-year-old female with asthma dyslipidemia hypertension GERD who presents for pulmonary consult.  Her physical exam is significant for scattered wheezing bilaterally.  The patient is started on triple therapy a prescription for promethazine has been given and she is instructed to follow-up in 2 weeks

## 2023-12-18 NOTE — HISTORY OF PRESENT ILLNESS
[Never] : never [TextBox_4] : PT Jennifer Rosenberg is a 59 yo female present today with a history of hypothyroidism and high colesterol. She presents for a pulmonary consultation. PT is experiencing congestion, HA, cough fatigue and excessive green/yellow sputum.  PT denies, fever chills, abdominal pain, body aches,

## 2024-01-03 ENCOUNTER — APPOINTMENT (OUTPATIENT)
Dept: PULMONOLOGY | Facility: CLINIC | Age: 59
End: 2024-01-03

## 2024-01-29 ENCOUNTER — APPOINTMENT (OUTPATIENT)
Dept: INTERNAL MEDICINE | Facility: CLINIC | Age: 59
End: 2024-01-29
Payer: MEDICAID

## 2024-01-29 VITALS
SYSTOLIC BLOOD PRESSURE: 129 MMHG | HEIGHT: 61 IN | OXYGEN SATURATION: 95 % | BODY MASS INDEX: 38.33 KG/M2 | WEIGHT: 203 LBS | TEMPERATURE: 97.7 F | RESPIRATION RATE: 14 BRPM | HEART RATE: 75 BPM | DIASTOLIC BLOOD PRESSURE: 83 MMHG

## 2024-01-29 DIAGNOSIS — Z00.00 ENCOUNTER FOR GENERAL ADULT MEDICAL EXAMINATION W/OUT ABNORMAL FINDINGS: ICD-10-CM

## 2024-01-29 PROCEDURE — 99396 PREV VISIT EST AGE 40-64: CPT

## 2024-01-29 PROCEDURE — G0444 DEPRESSION SCREEN ANNUAL: CPT | Mod: 59

## 2024-01-29 RX ORDER — MOMETASONE FUROATE 1 MG/G
0.1 CREAM TOPICAL TWICE DAILY
Qty: 1 | Refills: 3 | Status: ACTIVE | COMMUNITY
Start: 2024-01-29 | End: 1900-01-01

## 2024-01-29 RX ORDER — PROMETHAZINE HYDROCHLORIDE 6.25 MG/5ML
6.25 SOLUTION ORAL
Qty: 1 | Refills: 1 | Status: DISCONTINUED | COMMUNITY
Start: 2023-12-18 | End: 2024-01-29

## 2024-01-29 RX ORDER — AZITHROMYCIN 250 MG/1
250 TABLET, FILM COATED ORAL
Qty: 1 | Refills: 0 | Status: DISCONTINUED | COMMUNITY
Start: 2023-12-18 | End: 2024-01-29

## 2024-01-29 NOTE — PROGRESS NOTE ADULT - NSPROGADDITIONALINFOA_GEN_ALL_CORE
Anesthesia Pre Eval Note    Anesthesia ROS/Med Hx        Anesthetic Complication History:    Patient does not have a history of anesthetic complications      Pulmonary Review:  Patient does not have a pulmonary history      Neuro/Psych Review:  Patient does not have a neuro/psych history         Cardiovascular Review:   Exercise tolerance: good (>4 METS)    GI/HEPATIC/RENAL Review:   Comments: Hematemesis prior to admission (last episode, yesterday evening)    End/Other Review:    Positive for substance use - alcohol      Relevant Problems   No relevant active problems       Physical Exam     Airway   Mallampati: II  TM Distance: >3 FB    Cardiovascular    Cardio Rhythm: Regular  Cardio Rate: Normal    General Assessment  General Assessment: Alert and oriented    Dental Exam  Dental exam normal    Pulmonary Exam    Breath sounds clear to auscultation:  Yes      Anesthesia Plan:    ASA Status: 3  Anesthesia Type: MAC    Induction: Intravenous  Preferred Airway Type: Mask  Maintenance: TIVA  Premedication: None      Post-op Pain Management: Per Surgeon      Checklist  Reviewed: Outside Records, Allergies, Care Everywhere, Consultations, EKG, Patient Summary, DNR Status, NPO Status, Beta Blocker Status, Medications, Problem list, Past Med History and Anesthesia Record  Consent/Risks Discussed Statement:  The proposed anesthetic plan, including its risks and benefits, have been discussed with the Patient along with the risks and benefits of alternatives. Questions were encouraged and answered and the patient and/or representative understands and agrees to proceed.    I have discussed elements of the patient's history or examination, as noted above and/or as follows, that place the patient at higher risk of complications; BMI> 30 (obesity).    I discussed with the patient (and/or patient's legal representative) the risks and benefits of the proposed anesthesia plan, MAC, which may include services performed by other  anesthesia providers.    Alternative anesthesia plans, if available, were reviewed with the patient (and/or patient's legal representative). Discussion has been held with the patient (and/or patient's legal representative) regarding risks of anesthesia, which include allergic reaction, aspiration, intra-operative awareness, nausea, need for blood transfusion, eye injury, hypotension, nerve injury, emergence delirium, conversion to general anesthesia, oral injury, sore throat and vomiting and emergent situations that may require change in anesthesia plan.    The patient (and/or patient's legal representative) has indicated understanding, his/her questions have been answered, and he/she wishes to proceed with the planned anesthetic.    Blood Products: Not Anticipated     d/c plan reviewed with patient at bedside, all questions answered  reviewed with ACP Mary  discharge time: 36 min

## 2024-01-29 NOTE — HEALTH RISK ASSESSMENT
[Good] : ~his/her~  mood as  good [No] : No [0] : 2) Feeling down, depressed, or hopeless: Not at all (0) [PHQ-2 Negative - No further assessment needed] : PHQ-2 Negative - No further assessment needed [Patient reported mammogram was normal] : Patient reported mammogram was normal [Patient reported PAP Smear was normal] : Patient reported PAP Smear was normal [Patient reported bone density results were normal] : Patient reported bone density results were normal [Patient reported colonoscopy was abnormal] : Patient reported colonoscopy was abnormal [With Family] : lives with family [# of Members in Household ___] :  household currently consist of [unfilled] member(s) [Employed] : employed [High School] : high school [] :  [# Of Children ___] : has [unfilled] children [Feels Safe at Home] : Feels safe at home [Never] : Never [Little interest or pleasure doing things] : 1) Little interest or pleasure doing things [Feeling down, depressed, or hopeless] : 2) Feeling down, depressed, or hopeless [de-identified] : Admits she is not active.  [de-identified] : Admits her diet could be better.  [[id number]] : 0 [MammogramDate] : 10/2023 [PapSmearDate] : 2020 [BoneDensityDate] : 2020 [ColonoscopyDate] : 01/2020 [ColonoscopyComments] : Hiatal hernia, gastritis, duodenal ulcer.  [de-identified] :

## 2024-01-29 NOTE — HISTORY OF PRESENT ILLNESS
[de-identified] : Ms. CRYSTAL LI is a 58-year-old female with hx. of  HLD, hypothyroidism, fatty liver, OA, who presents for an annual physical exam.   Pt. states she is feeling well. Offers no complaints.

## 2024-01-29 NOTE — ASSESSMENT
[FreeTextEntry1] : Physical UTD with colonoscopy and mammogram referred for Bone density scan and to gyn  Hx of  gastritis, hiatal hernia follows with GI  HLD cont Simvastatin 5mg daily Reinforced the importance of following a low fat/low cholesterol diet we'll check lipids and LFt's today  Hypothyroidism cont Levothyroxine 75mcg daily we'll check TSH/T4 today  blood work ordered follow up in one week for lab results  PAST MEDICAL HISTORY:  Autism     HSP (Henoch Schonlein purpura)

## 2024-02-01 NOTE — HISTORY OF PRESENT ILLNESS
[FreeTextEntry1] : Follow-up  [de-identified] : CRYSTAL LI is a 56 year old female with a PMHx of dermatitis, gastritis, HLD, hypothyroidism, liver disease, and osteoarthritis who presents today for follow-up. She is feeling okay and offers no specific complaints. Pt she relates that she smells cigarettes smoke while no one is smoking around her.  \par  No

## 2024-02-19 ENCOUNTER — APPOINTMENT (OUTPATIENT)
Dept: INTERNAL MEDICINE | Facility: CLINIC | Age: 59
End: 2024-02-19

## 2024-03-06 ENCOUNTER — APPOINTMENT (OUTPATIENT)
Dept: INTERNAL MEDICINE | Facility: CLINIC | Age: 59
End: 2024-03-06
Payer: COMMERCIAL

## 2024-03-06 DIAGNOSIS — E66.9 OBESITY, UNSPECIFIED: ICD-10-CM

## 2024-03-06 DIAGNOSIS — R79.89 OTHER SPECIFIED ABNORMAL FINDINGS OF BLOOD CHEMISTRY: ICD-10-CM

## 2024-03-06 PROCEDURE — 99214 OFFICE O/P EST MOD 30 MIN: CPT

## 2024-03-06 RX ORDER — TIRZEPATIDE 2.5 MG/.5ML
2.5 INJECTION, SOLUTION SUBCUTANEOUS
Qty: 1 | Refills: 0 | Status: ACTIVE | COMMUNITY
Start: 2024-03-06 | End: 1900-01-01

## 2024-03-27 NOTE — HISTORY OF PRESENT ILLNESS
[Home] : at home, [unfilled] , at the time of the visit. [Medical Office: ([facility])___] : at the medical office located in  [de-identified] :  Ms. CRYSTAL LI is a 58 year old female seen in telemedicine for follow up visit to discuss lab results She is doing well.  Denies SOB, chest pain, abdominal pain, N/V/D, leg swelling, headache, dizziness  Offers no complaints

## 2024-03-27 NOTE — ASSESSMENT
[FreeTextEntry1] : Hx of  gastritis, hiatal hernia follows with GI s/p EGD:  results reviewed: + chronic gastritis  Obesity Start Zepbound 2.5 mg weekly Weight management, Healthy food choices, and increasing physical activity discussed  Lab results reviewed and discussed with patient  HLD-LDL is 119 cont Simvastatin 5mg daily Reinforced the importance of following a low fat/low cholesterol diet  Hypothyroidism-TSH is normal cont Levothyroxine 75mcg daily  low vit D start vit D 2000iu daily

## 2024-03-31 LAB
25(OH)D3 SERPL-MCNC: 20.7 NG/ML
ALBUMIN SERPL ELPH-MCNC: 4.6 G/DL
ALP BLD-CCNC: 98 U/L
ALT SERPL-CCNC: 42 U/L
ANION GAP SERPL CALC-SCNC: 11 MMOL/L
APPEARANCE: CLEAR
AST SERPL-CCNC: 28 U/L
BASOPHILS # BLD AUTO: 0.02 K/UL
BASOPHILS NFR BLD AUTO: 0.3 %
BILIRUB SERPL-MCNC: 0.3 MG/DL
BILIRUBIN URINE: NEGATIVE
BLOOD URINE: NEGATIVE
BUN SERPL-MCNC: 14 MG/DL
CALCIUM SERPL-MCNC: 9.2 MG/DL
CHLORIDE SERPL-SCNC: 103 MMOL/L
CHOLEST SERPL-MCNC: 190 MG/DL
CO2 SERPL-SCNC: 25 MMOL/L
COLOR: NORMAL
CREAT SERPL-MCNC: 0.72 MG/DL
EGFR: 96 ML/MIN/1.73M2
EOSINOPHIL # BLD AUTO: 0.14 K/UL
EOSINOPHIL NFR BLD AUTO: 1.9 %
ESTIMATED AVERAGE GLUCOSE: 120 MG/DL
GLUCOSE QUALITATIVE U: NEGATIVE MG/DL
GLUCOSE SERPL-MCNC: 79 MG/DL
HBA1C MFR BLD HPLC: 5.8 %
HCT VFR BLD CALC: 44.5 %
HDLC SERPL-MCNC: 54 MG/DL
HGB BLD-MCNC: 14.2 G/DL
IMM GRANULOCYTES NFR BLD AUTO: 0.3 %
KETONES URINE: NEGATIVE MG/DL
LDLC SERPL CALC-MCNC: 119 MG/DL
LEUKOCYTE ESTERASE URINE: NEGATIVE
LYMPHOCYTES # BLD AUTO: 1.82 K/UL
LYMPHOCYTES NFR BLD AUTO: 25 %
MAN DIFF?: NORMAL
MCHC RBC-ENTMCNC: 29.8 PG
MCHC RBC-ENTMCNC: 31.9 GM/DL
MCV RBC AUTO: 93.3 FL
MONOCYTES # BLD AUTO: 0.7 K/UL
MONOCYTES NFR BLD AUTO: 9.6 %
NEUTROPHILS # BLD AUTO: 4.57 K/UL
NEUTROPHILS NFR BLD AUTO: 62.9 %
NITRITE URINE: NEGATIVE
NONHDLC SERPL-MCNC: 135 MG/DL
PH URINE: 5.5
PLATELET # BLD AUTO: 320 K/UL
POTASSIUM SERPL-SCNC: 4.5 MMOL/L
PROT SERPL-MCNC: 7.6 G/DL
PROTEIN URINE: NEGATIVE MG/DL
RBC # BLD: 4.77 M/UL
RBC # FLD: 13.8 %
SODIUM SERPL-SCNC: 139 MMOL/L
SPECIFIC GRAVITY URINE: 1.03
TRIGL SERPL-MCNC: 90 MG/DL
TSH SERPL-ACNC: 4.14 UIU/ML
UROBILINOGEN URINE: 1 MG/DL
VIT B12 SERPL-MCNC: 620 PG/ML
WBC # FLD AUTO: 7.27 K/UL

## 2024-05-01 ENCOUNTER — APPOINTMENT (OUTPATIENT)
Dept: INTERNAL MEDICINE | Facility: CLINIC | Age: 59
End: 2024-05-01
Payer: COMMERCIAL

## 2024-05-01 VITALS
SYSTOLIC BLOOD PRESSURE: 113 MMHG | WEIGHT: 195 LBS | OXYGEN SATURATION: 97 % | HEIGHT: 61 IN | RESPIRATION RATE: 14 BRPM | HEART RATE: 79 BPM | TEMPERATURE: 98.1 F | DIASTOLIC BLOOD PRESSURE: 77 MMHG | BODY MASS INDEX: 36.82 KG/M2

## 2024-05-01 DIAGNOSIS — Z91.89 OTHER SPECIFIED PERSONAL RISK FACTORS, NOT ELSEWHERE CLASSIFIED: ICD-10-CM

## 2024-05-01 DIAGNOSIS — E78.5 HYPERLIPIDEMIA, UNSPECIFIED: ICD-10-CM

## 2024-05-01 DIAGNOSIS — E03.9 HYPOTHYROIDISM, UNSPECIFIED: ICD-10-CM

## 2024-05-01 PROCEDURE — 99214 OFFICE O/P EST MOD 30 MIN: CPT

## 2024-05-01 RX ORDER — LEVOTHYROXINE SODIUM 0.07 MG/1
75 TABLET ORAL
Qty: 90 | Refills: 1 | Status: ACTIVE | COMMUNITY
Start: 2019-06-07 | End: 1900-01-01

## 2024-05-01 RX ORDER — SIMVASTATIN 5 MG/1
5 TABLET, FILM COATED ORAL
Qty: 90 | Refills: 1 | Status: ACTIVE | COMMUNITY
Start: 2021-08-17 | End: 1900-01-01

## 2024-05-01 NOTE — ASSESSMENT
[FreeTextEntry1] : Follow up   HLD cont Simvastatin 5mg daily -renewed today Reinforced the importance of following a low fat/low cholesterol diet We'll check Lipid panel and LFTs today.  Hypothyroidism cont Levothyroxine 75mcg daily -renewed today We'll check TSH/T4 today.  Obesity Never started Zepbound 2.5mg weekly Weight management, Healthy food choices, and increased physical activity discussed  Hx of gastritis, hiatal hernia follows with GI  Blood work ordered. Follow up in one week for lab results

## 2024-05-01 NOTE — REVIEW OF SYSTEMS
OFFICE VISIT/PROGRESS NOTE      CHIEF COMPLAINT    Chief Complaint   Patient presents with   • Office Visit   • Breathing Problem     ANTHONY       HISTORY OF PRESENT ILLNESS    Ms. Yuliya Mitchell is a 78 year old White  female who presents with shortness of breath and pulmonary fibrosis in the setting of systemic sclerosis.  She did not tolerate nebulized albuterol or budesonide.  She is using Astelin nasal spray twice a day.  She is not using any nebulized or inhaled medications.  She notes sneezing and rhinorrhea after eating.  Astelin nasal spray was of no use.  She denies fever, chills and hemoptysis.  She underwent complete PFT's and 6 minute walk on 08/02/2022.  She underwent a home sleep study on 09/08/2022.  It revealed no evidence of significant sleep apnea.  He has into the care of Dr. Matthew Santoyo pulmonary hypertension.  She denies fever, chills and hemoptysis.          Follow up of present illness:      A.  Shortness of Breath? No     B.  Cough?   No     Are you able to cough anything up?  No     C.  Chest Tightness of Discomfort?   No     D.  Wheezing?   No  Any provoking factors?  na     E.  Have you been hospitalized since your last appointment? No     F.  Do you need any of your pulmonary medications refilled today?  no     G.  Any changes in your overall health?  No        I have reviewed the medications and allergies, past medical, surgical, social and family history with the patient, updating these as appropriate.  See Histories section of the EMR for a display of this information.    COMPLETE REVIEW OF SYSTEMS        All other systems were reviewed with the patient and are negative, except for pertinent positives and negatives as documented in the history of present illness.     PHYSICAL EXAM    Vital Signs:  Blood pressure 112/60, pulse 74, temperature 97.5 °F (36.4 °C), temperature source Skin, resp. rate 18, height 5' (1.524 m), weight 58 kg (127 lb 13.9 oz), SpO2 97 %.  Constitutional:   Well developed, well nourished White  female in no acute distress.   Eyes:  PERRLA.  EOM's intact.  Lids and conjunctivae normal.  Sclerae anicteric.  HENT:  Head atraumatic.  Normocephalic.  External ears normal.   Neck:  Supple.  Normal range of motion.  No tenderness.  No masses.  No cervical or supraclavicular adenopathy.  Thyroid not palpably enlarged.  Trachea midline.  No crepitus.  Chest/Respiratory:  Respirations nonlabored.  Chest symmetric with full expansion.  Breath sounds with fine bibasilar crackles on auscultation.  Cardiac:  Regular S1 and S2 without gallops, murmurs or jugular venous distention. No pericardial friction rub.  No bruits.  No pitting pretibial and pedal edema.  Abdomen:  Non-distended.   Musculoskeletal:  All four extremities without cyanosis or clubbing.  No pitting pretibial and pedal edema.  No tenderness.  No deformities.  Muscle strength and tone intact. Integument:  Well hydrated, no rash.  No ecchymoses.  No petechiae, ulcerations.   Neurologic/Psychiatric:  CN's II-XII intact.  No focal motor or sensory deficits.  No tremor.    Affect normal.  Alert and oriented x3.    ANCILLARY TESTS  1. 08/02/2022 complete PFT's and 6 minute walk as interpreted by me as follows:   1. Spirometry reveals mild expiratory airflow obstruction.  There is mild increase in the FEV1 and significant increase in the FEV 25-75% after inhaled albuterol.  Consider reversible airflow obstruction/airway hyperresponsiveness consistent with asthma.   2. Lung volumes by plethysmography are essentially normal with the exception of moderate increase in ERV of questionable significance.   3. Diffusion is moderately decreased at only mildly decreased after correction by alveolar volume.  Consider airflow obstruction and mild distribution of ventilation, pulmonary interstitial and/or vascular disease including pulmonary fibrosis, emphysema and pulmonary thromboembolic disease.  Clinical correlation advised.   4.  SpO2 on room air at rest is normal at 96%.     1. 6 minute walk.   2. Diagnosis:  Shortness of breath on exertion.       1. Baseline blood pressure 174/77.  It decreases to 168/63 after ambulation at end of test.   2. Baseline heart rate 86.  It increases to 90 after ambulation at end of test.   3. Baseline SpO2 on room air 98%.  It decreases to 96% after ambulation at end of test.   4. Moderate sensation of dyspnea at baseline.  Moderate to severe dyspnea after ambulation at end of test.   5. Mild sensation of fatigue at baseline.  Moderate to severe dyspnea after ambulation at end of test.   6. 6 minute walk distance 240 m or 59% of the predicted distance of 403.7 m.   7. There are no stops or pauses prior to completion of 6 minute walk.   8. There were no other symptoms after ambulation at end of test.      2. 09/08/2022 home sleep study as reviewed by me and as noted in the HPI.      ASSESSMENT:    1. Pulmonary fibrosis which is likely UIP related to systemic sclerosis, LV diastolic dysfunction and mild obstructive airways disease with mild reversibility.  Probability for pulmonary emboli by lung ventilation perfusion scan is low.  Prior nuclear stress test revealed no evidence of ischemic heart disease with normal LV function.  6 minute walk distance is reduced to 69% of predicted.  She denies significant shortness of breath at rest or on exertion.  2. Systemic sclerosis with pulmonary fibrosis, Raynaud's phenomenon, calcinosis, esophageal dysmotility and GERD.  3. Benign pulmonary nodules.  5. Mild obstructive airways disease without reversibility.  Consider chronic bronchitis secondary to moderate persistent asthma.  She was intolerant of nebulized albuterol and budesonide.  She is asymptomatic off inhaled medication.  6. Coronary artery disease.  7. LV diastolic dysfunction.  8. Paroxysmal atrial fibrillation.    9. Pulmonary hypertension by 2D echocardiogram.  Right heart catheterization reveals normal  pressures and cardiac output.  10. Left breast cancer status post lumpectomy.  11.  Temporal arteritis previously treated with prednisone.    12. Hypothyroidism on replacement.  13. CKD stage 3   14. Thyroid cancer status post thyroidectomy.  15. Osteoporosis.   16. Chronic rhinitis with postnasal drip and rhinorrhea which are likely nonallergic and poorly controlled on Astelin nasal spray..   17. Generalized fatigue secondary to above.  Home sleep study revealed no evidence of significant KAYLAH.    RECOMMENDATIONS:  1. Observe off inhaled and nebulized medication   2.  Consider anti-fibrotic therapy in the future.  3.  Return to clinic in  1 year or sooner if necessary.                [Negative] : Heme/Lymph [FreeTextEntry7] : Hx of gastritis

## 2024-05-01 NOTE — ADDENDUM
[FreeTextEntry1] : I, Emma Nicholson, am scribing for and in the presence of Dr. Jodee Downey DO on 05/01/2024.   All medical record entries made by the scribe were at my, Dr. Jodee Downey, direction and personally dictated by me on 05/01/2024. I have reviewed the chart and agree that the record accurately reflects my personal performance of the history, physical exam, assessment and plan. I have also personally directed, reviewed, and agreed with the chart.

## 2024-05-01 NOTE — HISTORY OF PRESENT ILLNESS
[de-identified] : Ms. CRYSTAL LI is a 58 year old female with a Hx of HLD, hypothyroidism, fatty liver, OA, chronic gastritis, hiatal hernia, who presents for a follow up visit and Rx refills.   Pt states she is feeling well. States she never started Zepbound. Reports compliance with taking her meds daily. Denies any SOB, CP, abdominal pain, N/V/D, headache, dizziness, or leg swelling.

## 2024-05-02 ENCOUNTER — LABORATORY RESULT (OUTPATIENT)
Age: 59
End: 2024-05-02

## 2024-05-08 LAB
ALBUMIN SERPL ELPH-MCNC: 4.2 G/DL
ALP BLD-CCNC: 106 U/L
ALT SERPL-CCNC: 45 U/L
ANION GAP SERPL CALC-SCNC: 12 MMOL/L
AST SERPL-CCNC: 35 U/L
BILIRUB SERPL-MCNC: 0.4 MG/DL
BUN SERPL-MCNC: 9 MG/DL
CALCIUM SERPL-MCNC: 9.3 MG/DL
CHLORIDE SERPL-SCNC: 103 MMOL/L
CHOLEST SERPL-MCNC: 157 MG/DL
CO2 SERPL-SCNC: 25 MMOL/L
CREAT SERPL-MCNC: 0.79 MG/DL
EGFR: 86 ML/MIN/1.73M2
ESTIMATED AVERAGE GLUCOSE: 117 MG/DL
GLUCOSE SERPL-MCNC: 90 MG/DL
HBA1C MFR BLD HPLC: 5.7 %
HDLC SERPL-MCNC: 56 MG/DL
LDLC SERPL CALC-MCNC: 86 MG/DL
NONHDLC SERPL-MCNC: 101 MG/DL
POTASSIUM SERPL-SCNC: 5 MMOL/L
PROT SERPL-MCNC: 7.7 G/DL
SODIUM SERPL-SCNC: 139 MMOL/L
TRIGL SERPL-MCNC: 78 MG/DL
TSH SERPL-ACNC: 5.09 UIU/ML

## 2024-06-12 LAB
T4 FREE SERPL-MCNC: 1.3 NG/DL
TSH SERPL-ACNC: 4.17 UIU/ML

## 2024-10-14 ENCOUNTER — APPOINTMENT (OUTPATIENT)
Dept: PODIATRY | Facility: CLINIC | Age: 59
End: 2024-10-14

## 2024-10-14 DIAGNOSIS — M79.674 PAIN IN RIGHT TOE(S): ICD-10-CM

## 2024-10-14 DIAGNOSIS — S92.514A NONDISPLACED FRACTURE OF PROXIMAL PHALANX OF RIGHT LESSER TOE(S), INITIAL ENCOUNTER FOR CLOSED FRACTURE: ICD-10-CM

## 2024-10-14 PROCEDURE — 28510 TREATMENT OF TOE FRACTURE: CPT | Mod: T9

## 2024-10-14 PROCEDURE — 99212 OFFICE O/P EST SF 10 MIN: CPT | Mod: 25

## 2024-10-21 PROBLEM — S92.514A CLOSED NONDISPLACED FRACTURE OF PROXIMAL PHALANX OF LESSER TOE OF RIGHT FOOT, INITIAL ENCOUNTER: Status: ACTIVE | Noted: 2024-10-16

## 2024-10-21 PROBLEM — M79.674 PAIN OF TOE OF RIGHT FOOT: Status: ACTIVE | Noted: 2024-10-16

## 2024-11-19 ENCOUNTER — APPOINTMENT (OUTPATIENT)
Dept: INTERNAL MEDICINE | Facility: CLINIC | Age: 59
End: 2024-11-19

## 2024-11-19 VITALS
SYSTOLIC BLOOD PRESSURE: 119 MMHG | DIASTOLIC BLOOD PRESSURE: 82 MMHG | BODY MASS INDEX: 36.94 KG/M2 | HEIGHT: 61 IN | OXYGEN SATURATION: 97 % | RESPIRATION RATE: 12 BRPM | WEIGHT: 195.66 LBS | HEART RATE: 75 BPM

## 2024-11-19 DIAGNOSIS — E78.5 HYPERLIPIDEMIA, UNSPECIFIED: ICD-10-CM

## 2024-11-19 DIAGNOSIS — E03.9 HYPOTHYROIDISM, UNSPECIFIED: ICD-10-CM

## 2024-11-19 PROCEDURE — 90471 IMMUNIZATION ADMIN: CPT

## 2024-11-19 PROCEDURE — 90673 RIV3 VACCINE NO PRESERV IM: CPT

## 2024-11-19 PROCEDURE — 99213 OFFICE O/P EST LOW 20 MIN: CPT | Mod: 25

## 2024-11-21 ENCOUNTER — TRANSCRIPTION ENCOUNTER (OUTPATIENT)
Age: 59
End: 2024-11-21

## 2025-01-07 ENCOUNTER — APPOINTMENT (OUTPATIENT)
Dept: INTERNAL MEDICINE | Facility: CLINIC | Age: 60
End: 2025-01-07
Payer: MEDICAID

## 2025-01-07 VITALS
WEIGHT: 195 LBS | HEIGHT: 61 IN | BODY MASS INDEX: 36.82 KG/M2 | OXYGEN SATURATION: 95 % | DIASTOLIC BLOOD PRESSURE: 82 MMHG | RESPIRATION RATE: 19 BRPM | TEMPERATURE: 99.1 F | HEART RATE: 88 BPM | SYSTOLIC BLOOD PRESSURE: 123 MMHG

## 2025-01-07 DIAGNOSIS — J06.9 ACUTE UPPER RESPIRATORY INFECTION, UNSPECIFIED: ICD-10-CM

## 2025-01-07 PROCEDURE — 99214 OFFICE O/P EST MOD 30 MIN: CPT

## 2025-01-07 RX ORDER — AZITHROMYCIN 250 MG/1
250 TABLET, FILM COATED ORAL
Qty: 1 | Refills: 0 | Status: ACTIVE | COMMUNITY
Start: 2025-01-07 | End: 1900-01-01

## 2025-01-07 RX ORDER — BENZONATATE 200 MG/1
200 CAPSULE ORAL
Qty: 30 | Refills: 0 | Status: ACTIVE | COMMUNITY
Start: 2025-01-07 | End: 1900-01-01

## 2025-01-07 RX ORDER — ALBUTEROL SULFATE 90 UG/1
108 (90 BASE) INHALANT RESPIRATORY (INHALATION) EVERY 4 HOURS
Qty: 1 | Refills: 3 | Status: ACTIVE | COMMUNITY
Start: 2025-01-07 | End: 1900-01-01

## 2025-02-04 ENCOUNTER — APPOINTMENT (OUTPATIENT)
Dept: INTERNAL MEDICINE | Facility: CLINIC | Age: 60
End: 2025-02-04
Payer: MEDICAID

## 2025-02-04 VITALS
DIASTOLIC BLOOD PRESSURE: 79 MMHG | SYSTOLIC BLOOD PRESSURE: 118 MMHG | BODY MASS INDEX: 37 KG/M2 | HEART RATE: 86 BPM | RESPIRATION RATE: 14 BRPM | HEIGHT: 61 IN | OXYGEN SATURATION: 97 % | WEIGHT: 196 LBS

## 2025-02-04 DIAGNOSIS — Z00.00 ENCOUNTER FOR GENERAL ADULT MEDICAL EXAMINATION W/OUT ABNORMAL FINDINGS: ICD-10-CM

## 2025-02-04 PROCEDURE — 99396 PREV VISIT EST AGE 40-64: CPT

## 2025-02-04 RX ORDER — PANTOPRAZOLE 40 MG/1
40 TABLET, DELAYED RELEASE ORAL DAILY
Qty: 1 | Refills: 1 | Status: ACTIVE | COMMUNITY

## 2025-02-11 ENCOUNTER — LABORATORY RESULT (OUTPATIENT)
Age: 60
End: 2025-02-11

## 2025-02-11 ENCOUNTER — APPOINTMENT (OUTPATIENT)
Dept: OBGYN | Facility: CLINIC | Age: 60
End: 2025-02-11

## 2025-02-16 LAB
25(OH)D3 SERPL-MCNC: 28.7 NG/ML
ALBUMIN SERPL ELPH-MCNC: 4.1 G/DL
ALP BLD-CCNC: 85 U/L
ALT SERPL-CCNC: 54 U/L
ANION GAP SERPL CALC-SCNC: 13 MMOL/L
APPEARANCE: CLEAR
AST SERPL-CCNC: 34 U/L
BASOPHILS # BLD AUTO: 0.03 K/UL
BASOPHILS NFR BLD AUTO: 0.4 %
BILIRUB SERPL-MCNC: 0.5 MG/DL
BILIRUBIN URINE: NEGATIVE
BLOOD URINE: NEGATIVE
BUN SERPL-MCNC: 17 MG/DL
CALCIUM SERPL-MCNC: 9.3 MG/DL
CHLORIDE SERPL-SCNC: 103 MMOL/L
CHOLEST SERPL-MCNC: 192 MG/DL
CO2 SERPL-SCNC: 23 MMOL/L
COLOR: YELLOW
CREAT SERPL-MCNC: 0.87 MG/DL
EGFR: 76 ML/MIN/1.73M2
EOSINOPHIL # BLD AUTO: 0.14 K/UL
EOSINOPHIL NFR BLD AUTO: 2 %
ESTIMATED AVERAGE GLUCOSE: 117 MG/DL
GLUCOSE QUALITATIVE U: NEGATIVE MG/DL
GLUCOSE SERPL-MCNC: 87 MG/DL
HBA1C MFR BLD HPLC: 5.7 %
HCT VFR BLD CALC: 45.9 %
HDLC SERPL-MCNC: 56 MG/DL
HGB BLD-MCNC: 14.3 G/DL
IMM GRANULOCYTES NFR BLD AUTO: 0.3 %
KETONES URINE: NEGATIVE MG/DL
LDLC SERPL CALC-MCNC: 122 MG/DL
LEUKOCYTE ESTERASE URINE: ABNORMAL
LYMPHOCYTES # BLD AUTO: 2.34 K/UL
LYMPHOCYTES NFR BLD AUTO: 33.6 %
MAN DIFF?: NORMAL
MCHC RBC-ENTMCNC: 29.8 PG
MCHC RBC-ENTMCNC: 31.2 G/DL
MCV RBC AUTO: 95.6 FL
MONOCYTES # BLD AUTO: 0.58 K/UL
MONOCYTES NFR BLD AUTO: 8.3 %
NEUTROPHILS # BLD AUTO: 3.85 K/UL
NEUTROPHILS NFR BLD AUTO: 55.4 %
NITRITE URINE: NEGATIVE
NONHDLC SERPL-MCNC: 136 MG/DL
PH URINE: 6
PLATELET # BLD AUTO: 293 K/UL
POTASSIUM SERPL-SCNC: 4.9 MMOL/L
PROT SERPL-MCNC: 7.4 G/DL
PROTEIN URINE: NEGATIVE MG/DL
RBC # BLD: 4.8 M/UL
RBC # FLD: 14.2 %
SODIUM SERPL-SCNC: 139 MMOL/L
SPECIFIC GRAVITY URINE: 1.02
TRIGL SERPL-MCNC: 76 MG/DL
TSH SERPL-ACNC: 8.66 UIU/ML
UROBILINOGEN URINE: 0.2 MG/DL
VIT B12 SERPL-MCNC: 702 PG/ML
WBC # FLD AUTO: 6.96 K/UL

## 2025-02-18 ENCOUNTER — APPOINTMENT (OUTPATIENT)
Dept: OBGYN | Facility: CLINIC | Age: 60
End: 2025-02-18
Payer: MEDICAID

## 2025-02-18 VITALS
BODY MASS INDEX: 37.38 KG/M2 | HEIGHT: 61 IN | SYSTOLIC BLOOD PRESSURE: 120 MMHG | HEART RATE: 69 BPM | WEIGHT: 198 LBS | DIASTOLIC BLOOD PRESSURE: 81 MMHG

## 2025-02-18 DIAGNOSIS — Z01.419 ENCOUNTER FOR GYNECOLOGICAL EXAMINATION (GENERAL) (ROUTINE) W/OUT ABNORMAL FINDINGS: ICD-10-CM

## 2025-02-18 PROCEDURE — 96127 BRIEF EMOTIONAL/BEHAV ASSMT: CPT

## 2025-02-18 PROCEDURE — 99459 PELVIC EXAMINATION: CPT

## 2025-02-18 PROCEDURE — 99386 PREV VISIT NEW AGE 40-64: CPT

## 2025-02-23 LAB
CYTOLOGY CVX/VAG DOC THIN PREP: NORMAL
HPV HIGH+LOW RISK DNA PNL CVX: NOT DETECTED

## 2025-03-22 LAB
ALBUMIN SERPL ELPH-MCNC: 4.2 G/DL
ALP BLD-CCNC: 84 U/L
ALT SERPL-CCNC: 34 U/L
ANION GAP SERPL CALC-SCNC: 12 MMOL/L
AST SERPL-CCNC: 24 U/L
BILIRUB SERPL-MCNC: 0.4 MG/DL
BUN SERPL-MCNC: 23 MG/DL
CALCIUM SERPL-MCNC: 9.6 MG/DL
CHLORIDE SERPL-SCNC: 104 MMOL/L
CO2 SERPL-SCNC: 24 MMOL/L
CREAT SERPL-MCNC: 0.93 MG/DL
EGFRCR SERPLBLD CKD-EPI 2021: 70 ML/MIN/1.73M2
GLUCOSE SERPL-MCNC: 92 MG/DL
POTASSIUM SERPL-SCNC: 5.6 MMOL/L
PROT SERPL-MCNC: 7.7 G/DL
SODIUM SERPL-SCNC: 141 MMOL/L
T4 FREE SERPL-MCNC: 1.3 NG/DL
TSH SERPL-ACNC: 11.1 UIU/ML

## 2025-05-22 ENCOUNTER — LABORATORY RESULT (OUTPATIENT)
Age: 60
End: 2025-05-22

## 2025-06-30 ENCOUNTER — APPOINTMENT (OUTPATIENT)
Dept: INTERNAL MEDICINE | Facility: CLINIC | Age: 60
End: 2025-06-30
Payer: MEDICAID

## 2025-06-30 VITALS
WEIGHT: 199 LBS | BODY MASS INDEX: 37.57 KG/M2 | HEIGHT: 61 IN | TEMPERATURE: 98.1 F | OXYGEN SATURATION: 97 % | RESPIRATION RATE: 14 BRPM | HEART RATE: 105 BPM

## 2025-06-30 VITALS — DIASTOLIC BLOOD PRESSURE: 80 MMHG | SYSTOLIC BLOOD PRESSURE: 114 MMHG

## 2025-06-30 PROBLEM — I47.10 SUPRAVENTRICULAR TACHYCARDIA BY ECG: Status: ACTIVE | Noted: 2025-06-30

## 2025-06-30 PROCEDURE — 93000 ELECTROCARDIOGRAM COMPLETE: CPT

## 2025-06-30 PROCEDURE — 99214 OFFICE O/P EST MOD 30 MIN: CPT

## 2025-07-03 ENCOUNTER — APPOINTMENT (OUTPATIENT)
Dept: INTERNAL MEDICINE | Facility: CLINIC | Age: 60
End: 2025-07-03
Payer: MEDICAID

## 2025-07-03 ENCOUNTER — LABORATORY RESULT (OUTPATIENT)
Age: 60
End: 2025-07-03

## 2025-07-03 VITALS
RESPIRATION RATE: 15 BRPM | DIASTOLIC BLOOD PRESSURE: 79 MMHG | WEIGHT: 199 LBS | HEIGHT: 61 IN | SYSTOLIC BLOOD PRESSURE: 115 MMHG | TEMPERATURE: 97.5 F | OXYGEN SATURATION: 97 % | BODY MASS INDEX: 37.57 KG/M2 | HEART RATE: 73 BPM

## 2025-07-03 PROBLEM — Z87.898 HISTORY OF HEARTBURN: Status: RESOLVED | Noted: 2025-07-03 | Resolved: 2025-07-03

## 2025-07-03 PROCEDURE — 99214 OFFICE O/P EST MOD 30 MIN: CPT

## 2025-07-08 ENCOUNTER — APPOINTMENT (OUTPATIENT)
Dept: INTERNAL MEDICINE | Facility: CLINIC | Age: 60
End: 2025-07-08

## 2025-07-08 LAB
ALBUMIN SERPL ELPH-MCNC: 4.5 G/DL
ALP BLD-CCNC: 93 U/L
ALT SERPL-CCNC: 34 U/L
ANION GAP SERPL CALC-SCNC: 15 MMOL/L
AST SERPL-CCNC: 29 U/L
BILIRUB SERPL-MCNC: 0.6 MG/DL
BUN SERPL-MCNC: 17 MG/DL
CALCIUM SERPL-MCNC: 10 MG/DL
CHLORIDE SERPL-SCNC: 101 MMOL/L
CHOLEST SERPL-MCNC: 218 MG/DL
CO2 SERPL-SCNC: 23 MMOL/L
CREAT SERPL-MCNC: 0.92 MG/DL
EGFRCR SERPLBLD CKD-EPI 2021: 71 ML/MIN/1.73M2
ESTIMATED AVERAGE GLUCOSE: 128 MG/DL
GLUCOSE SERPL-MCNC: 98 MG/DL
HBA1C MFR BLD HPLC: 6.1 %
HDLC SERPL-MCNC: 62 MG/DL
LDLC SERPL-MCNC: 140 MG/DL
NONHDLC SERPL-MCNC: 156 MG/DL
POTASSIUM SERPL-SCNC: 5.5 MMOL/L
PROT SERPL-MCNC: 8.3 G/DL
SODIUM SERPL-SCNC: 139 MMOL/L
TRIGL SERPL-MCNC: 90 MG/DL
TSH SERPL-ACNC: 7.66 UIU/ML

## 2025-07-08 PROCEDURE — 99214 OFFICE O/P EST MOD 30 MIN: CPT | Mod: 95

## 2025-07-14 ENCOUNTER — APPOINTMENT (OUTPATIENT)
Dept: PODIATRY | Facility: CLINIC | Age: 60
End: 2025-07-14

## 2025-09-04 LAB
T4 FREE SERPL-MCNC: 1.4 NG/DL
TSH SERPL-ACNC: 3.65 UIU/ML

## (undated) DEVICE — STAPLER SKIN MULTI DIRECTION W35

## (undated) DEVICE — DRSG GAUZE MOISTURIZER 0.5 OZ 4X8

## (undated) DEVICE — BUR STRYKER EGG 4MM

## (undated) DEVICE — SUT VICRYL 4-0 27" SH UNDYED

## (undated) DEVICE — DRSG STERISTRIPS 0.5 X 4"

## (undated) DEVICE — WARMING BLANKET FULL ADULT

## (undated) DEVICE — DRILL BIT STRYKER 2.0MM

## (undated) DEVICE — SAW BLADE MICROAIRE SAGITTAL 9.5MMX25.4MMX0.6MM

## (undated) DEVICE — DRSG MASTISOL

## (undated) DEVICE — VENODYNE/SCD SLEEVE CALF MEDIUM

## (undated) DEVICE — SAW BLADE STRYKER PRECISION THIN SAGITTAL MEDIUM 9MM X 25MM

## (undated) DEVICE — DRSG COBAN 4"

## (undated) DEVICE — SUT VICRYL 3-0 27" SH UNDYED

## (undated) DEVICE — DRSG ADAPTIC 3 X 8"

## (undated) DEVICE — DRAPE 3/4 SHEET W REINFORCEMENT 56X77"

## (undated) DEVICE — SUT VICRYL PLUS 2-0 27" SH UNDYED

## (undated) DEVICE — GLV 7.5 PROTEXIS (WHITE)

## (undated) DEVICE — TOURNIQUET ESMARK 4"

## (undated) DEVICE — SUT PROLENE 4-0 18" PS-2

## (undated) DEVICE — DRAPE TOWEL BLUE 17" X 24"

## (undated) DEVICE — PACK ORTHO